# Patient Record
Sex: MALE | Race: WHITE | NOT HISPANIC OR LATINO | URBAN - METROPOLITAN AREA
[De-identification: names, ages, dates, MRNs, and addresses within clinical notes are randomized per-mention and may not be internally consistent; named-entity substitution may affect disease eponyms.]

---

## 2017-01-24 ENCOUNTER — FOLLOW UP ESTABLISHED (OUTPATIENT)
Dept: URBAN - METROPOLITAN AREA CLINIC 24 | Facility: CLINIC | Age: 62
End: 2017-01-24
Payer: COMMERCIAL

## 2017-01-24 PROCEDURE — 92083 EXTENDED VISUAL FIELD XM: CPT | Performed by: OPTOMETRIST

## 2017-01-24 PROCEDURE — 92020 GONIOSCOPY: CPT | Performed by: OPTOMETRIST

## 2017-01-24 PROCEDURE — 76514 ECHO EXAM OF EYE THICKNESS: CPT | Performed by: OPTOMETRIST

## 2017-01-24 PROCEDURE — 92133 CPTRZD OPH DX IMG PST SGM ON: CPT | Performed by: OPTOMETRIST

## 2017-01-24 PROCEDURE — 92012 INTRM OPH EXAM EST PATIENT: CPT | Performed by: OPTOMETRIST

## 2017-01-24 RX ORDER — CYCLOSPORINE 0.5 MG/ML
0.05 % EMULSION OPHTHALMIC
Qty: 60 | Refills: 11 | Status: INACTIVE
Start: 2017-01-24 | End: 2017-02-22

## 2017-01-24 ASSESSMENT — INTRAOCULAR PRESSURE
OS: 21
OD: 19

## 2017-02-02 ENCOUNTER — FOLLOW UP ESTABLISHED (OUTPATIENT)
Dept: URBAN - METROPOLITAN AREA CLINIC 24 | Facility: CLINIC | Age: 62
End: 2017-02-02
Payer: COMMERCIAL

## 2017-02-02 PROCEDURE — 92134 CPTRZ OPH DX IMG PST SGM RTA: CPT | Performed by: OPHTHALMOLOGY

## 2017-02-02 PROCEDURE — 92014 COMPRE OPH EXAM EST PT 1/>: CPT | Performed by: OPHTHALMOLOGY

## 2017-02-02 ASSESSMENT — INTRAOCULAR PRESSURE
OS: 12
OD: 10

## 2017-04-17 ENCOUNTER — Encounter (OUTPATIENT)
Dept: URBAN - METROPOLITAN AREA CLINIC 24 | Facility: CLINIC | Age: 62
End: 2017-04-17
Payer: COMMERCIAL

## 2017-04-17 PROCEDURE — 99213 OFFICE O/P EST LOW 20 MIN: CPT | Performed by: PHYSICIAN ASSISTANT

## 2017-04-20 ENCOUNTER — SURGERY (OUTPATIENT)
Dept: URBAN - METROPOLITAN AREA SURGERY 12 | Facility: SURGERY | Age: 62
End: 2017-04-20
Payer: COMMERCIAL

## 2017-04-20 PROCEDURE — 67042 VIT FOR MACULAR HOLE: CPT | Performed by: OPHTHALMOLOGY

## 2017-04-20 RX ORDER — HYDROCODONE BITARTRATE AND ACETAMINOPHEN 5; 325 MG/1; MG/1
TABLET ORAL
Qty: 10 | Refills: 0 | Status: INACTIVE
Start: 2017-04-20 | End: 2017-05-11

## 2017-04-20 RX ORDER — PREDNISOLONE ACETATE 10 MG/ML
1 % SUSPENSION/ DROPS OPHTHALMIC
Qty: 1 | Refills: 1 | Status: INACTIVE
Start: 2017-04-20 | End: 2017-07-07

## 2017-04-20 RX ORDER — OFLOXACIN 3 MG/ML
0.3 % SOLUTION/ DROPS OPHTHALMIC
Qty: 1 | Refills: 1 | Status: INACTIVE
Start: 2017-04-20 | End: 2017-04-28

## 2017-04-21 ENCOUNTER — POST OP (OUTPATIENT)
Dept: URBAN - METROPOLITAN AREA CLINIC 24 | Facility: CLINIC | Age: 62
End: 2017-04-21

## 2017-04-21 PROCEDURE — 99024 POSTOP FOLLOW-UP VISIT: CPT | Performed by: OPTOMETRIST

## 2017-04-21 ASSESSMENT — INTRAOCULAR PRESSURE: OD: 20

## 2017-04-25 ENCOUNTER — Encounter (OUTPATIENT)
Dept: URBAN - METROPOLITAN AREA CLINIC 30 | Facility: CLINIC | Age: 62
End: 2017-04-25
Payer: COMMERCIAL

## 2017-04-27 ENCOUNTER — SURGERY (OUTPATIENT)
Dept: URBAN - METROPOLITAN AREA SURGERY 12 | Facility: SURGERY | Age: 62
End: 2017-04-27
Payer: COMMERCIAL

## 2017-04-27 PROCEDURE — 67036 REMOVAL OF INNER EYE FLUID: CPT | Performed by: OPHTHALMOLOGY

## 2017-04-27 PROCEDURE — 66825 REPOSITION INTRAOCULAR LENS: CPT | Performed by: OPHTHALMOLOGY

## 2017-04-27 RX ORDER — OFLOXACIN 3 MG/ML
0.3 % SOLUTION/ DROPS OPHTHALMIC
Qty: 1 | Refills: 0 | Status: INACTIVE
Start: 2017-04-27 | End: 2017-07-07

## 2017-04-27 RX ORDER — PREDNISOLONE ACETATE 10 MG/ML
1 % SUSPENSION/ DROPS OPHTHALMIC
Qty: 1 | Refills: 0 | Status: INACTIVE
Start: 2017-04-27 | End: 2017-04-28

## 2017-04-28 ENCOUNTER — POST OP (OUTPATIENT)
Dept: URBAN - METROPOLITAN AREA CLINIC 24 | Facility: CLINIC | Age: 62
End: 2017-04-28

## 2017-04-28 PROCEDURE — 99024 POSTOP FOLLOW-UP VISIT: CPT | Performed by: OPTOMETRIST

## 2017-04-28 ASSESSMENT — INTRAOCULAR PRESSURE: OD: 19

## 2017-05-11 ENCOUNTER — FOLLOW UP ESTABLISHED (OUTPATIENT)
Dept: URBAN - METROPOLITAN AREA CLINIC 24 | Facility: CLINIC | Age: 62
End: 2017-05-11
Payer: COMMERCIAL

## 2017-05-11 PROCEDURE — 99024 POSTOP FOLLOW-UP VISIT: CPT | Performed by: OPHTHALMOLOGY

## 2017-05-11 PROCEDURE — 92134 CPTRZ OPH DX IMG PST SGM RTA: CPT | Performed by: OPHTHALMOLOGY

## 2017-05-11 RX ORDER — NEOMYCIN SULFATE, POLYMYXIN B SULFATE AND DEXAMETHASONE 3.5; 10000; 1 MG/G; [USP'U]/G; MG/G
OINTMENT OPHTHALMIC
Qty: 1 | Refills: 2 | Status: INACTIVE
Start: 2017-05-11 | End: 2017-06-15

## 2017-05-11 ASSESSMENT — INTRAOCULAR PRESSURE
OS: 17
OD: 24

## 2017-06-15 ENCOUNTER — POST OP (OUTPATIENT)
Dept: URBAN - METROPOLITAN AREA CLINIC 24 | Facility: CLINIC | Age: 62
End: 2017-06-15
Payer: COMMERCIAL

## 2017-06-15 PROCEDURE — 92134 CPTRZ OPH DX IMG PST SGM RTA: CPT | Performed by: OPHTHALMOLOGY

## 2017-06-15 PROCEDURE — 99024 POSTOP FOLLOW-UP VISIT: CPT | Performed by: OPHTHALMOLOGY

## 2017-06-15 ASSESSMENT — INTRAOCULAR PRESSURE
OS: 14
OD: 22

## 2017-07-07 ENCOUNTER — RX CHECK (OUTPATIENT)
Dept: URBAN - METROPOLITAN AREA CLINIC 24 | Facility: CLINIC | Age: 62
End: 2017-07-07
Payer: COMMERCIAL

## 2017-07-07 DIAGNOSIS — H40.013 OPEN ANGLE WITH BORDERLINE FINDINGS, LOW RISK, BILATERAL: ICD-10-CM

## 2017-07-07 PROCEDURE — 99024 POSTOP FOLLOW-UP VISIT: CPT | Performed by: OPTOMETRIST

## 2017-07-07 RX ORDER — LIFITEGRAST 50 MG/ML
5 % SOLUTION/ DROPS OPHTHALMIC
Qty: 60 | Refills: 11 | Status: INACTIVE
Start: 2017-07-07 | End: 2018-05-30

## 2017-07-07 ASSESSMENT — VISUAL ACUITY
OD: 20/20
OS: 20/20

## 2017-07-07 ASSESSMENT — INTRAOCULAR PRESSURE
OS: 17
OD: 16

## 2017-07-07 ASSESSMENT — KERATOMETRY
OS: 45.56
OD: 45.34

## 2017-08-17 ENCOUNTER — FOLLOW UP ESTABLISHED (OUTPATIENT)
Dept: URBAN - METROPOLITAN AREA CLINIC 24 | Facility: CLINIC | Age: 62
End: 2017-08-17
Payer: COMMERCIAL

## 2017-08-17 PROCEDURE — 92134 CPTRZ OPH DX IMG PST SGM RTA: CPT | Performed by: OPHTHALMOLOGY

## 2017-08-17 PROCEDURE — 92014 COMPRE OPH EXAM EST PT 1/>: CPT | Performed by: OPHTHALMOLOGY

## 2017-08-17 ASSESSMENT — INTRAOCULAR PRESSURE
OS: 14
OD: 12

## 2017-10-17 ENCOUNTER — FOLLOW UP ESTABLISHED (OUTPATIENT)
Dept: URBAN - METROPOLITAN AREA CLINIC 24 | Facility: CLINIC | Age: 62
End: 2017-10-17
Payer: COMMERCIAL

## 2017-10-17 DIAGNOSIS — H35.371 PUCKERING OF MACULA, RIGHT EYE: ICD-10-CM

## 2017-10-17 DIAGNOSIS — E11.9 TYPE 2 DIABETES MELLITUS WITHOUT COMPLICATIONS: ICD-10-CM

## 2017-10-17 PROCEDURE — 92250 FUNDUS PHOTOGRAPHY W/I&R: CPT | Performed by: OPTOMETRIST

## 2017-10-17 PROCEDURE — 99214 OFFICE O/P EST MOD 30 MIN: CPT | Performed by: OPTOMETRIST

## 2017-10-17 RX ORDER — TIMOLOL MALEATE 5 MG/ML
0.5 % SOLUTION/ DROPS OPHTHALMIC
Qty: 1 | Refills: 5 | Status: INACTIVE
Start: 2017-10-17 | End: 2018-02-15

## 2017-10-17 ASSESSMENT — INTRAOCULAR PRESSURE
OS: 16
OD: 14

## 2017-10-17 ASSESSMENT — VISUAL ACUITY
OD: 20/25
OS: 20/20

## 2017-12-18 ENCOUNTER — FOLLOW UP ESTABLISHED (OUTPATIENT)
Dept: URBAN - METROPOLITAN AREA CLINIC 24 | Facility: CLINIC | Age: 62
End: 2017-12-18
Payer: COMMERCIAL

## 2017-12-18 DIAGNOSIS — H40.023 OPEN ANGLE WITH BORDERLINE FINDINGS, HIGH RISK, BILATERAL: Primary | ICD-10-CM

## 2017-12-18 PROCEDURE — 92250 FUNDUS PHOTOGRAPHY W/I&R: CPT | Performed by: OPTOMETRIST

## 2017-12-18 PROCEDURE — 92083 EXTENDED VISUAL FIELD XM: CPT | Performed by: OPTOMETRIST

## 2017-12-18 PROCEDURE — 92012 INTRM OPH EXAM EST PATIENT: CPT | Performed by: OPTOMETRIST

## 2017-12-18 ASSESSMENT — INTRAOCULAR PRESSURE
OS: 15
OD: 12

## 2018-02-15 ENCOUNTER — FOLLOW UP ESTABLISHED (OUTPATIENT)
Dept: URBAN - METROPOLITAN AREA CLINIC 24 | Facility: CLINIC | Age: 63
End: 2018-02-15
Payer: COMMERCIAL

## 2018-02-15 PROCEDURE — 92134 CPTRZ OPH DX IMG PST SGM RTA: CPT | Performed by: OPHTHALMOLOGY

## 2018-02-15 PROCEDURE — 92014 COMPRE OPH EXAM EST PT 1/>: CPT | Performed by: OPHTHALMOLOGY

## 2018-02-15 RX ORDER — KETOROLAC TROMETHAMINE 5 MG/ML
0.5 % SOLUTION OPHTHALMIC
Qty: 1 | Refills: 1 | Status: INACTIVE
Start: 2018-02-15 | End: 2018-03-29

## 2018-02-15 RX ORDER — TIMOLOL MALEATE 5 MG/ML
0.5 % SOLUTION/ DROPS OPHTHALMIC
Qty: 1 | Refills: 5 | Status: INACTIVE
Start: 2018-02-15 | End: 2018-04-16

## 2018-02-15 RX ORDER — PREDNISOLONE ACETATE 10 MG/ML
1 % SUSPENSION/ DROPS OPHTHALMIC
Qty: 1 | Refills: 0 | Status: INACTIVE
Start: 2018-02-15 | End: 2018-03-29

## 2018-02-15 ASSESSMENT — INTRAOCULAR PRESSURE
OD: 12
OS: 12

## 2018-03-29 ENCOUNTER — FOLLOW UP ESTABLISHED (OUTPATIENT)
Dept: URBAN - METROPOLITAN AREA CLINIC 24 | Facility: CLINIC | Age: 63
End: 2018-03-29
Payer: COMMERCIAL

## 2018-03-29 PROCEDURE — 92134 CPTRZ OPH DX IMG PST SGM RTA: CPT | Performed by: OPHTHALMOLOGY

## 2018-03-29 PROCEDURE — 92014 COMPRE OPH EXAM EST PT 1/>: CPT | Performed by: OPHTHALMOLOGY

## 2018-03-29 RX ORDER — PREDNISOLONE ACETATE 10 MG/ML
1 % SUSPENSION/ DROPS OPHTHALMIC
Qty: 1 | Refills: 2 | Status: INACTIVE
Start: 2018-03-29 | End: 2018-04-24

## 2018-03-29 RX ORDER — KETOROLAC TROMETHAMINE 5 MG/ML
0.5 % SOLUTION OPHTHALMIC
Qty: 1 | Refills: 2 | Status: INACTIVE
Start: 2018-03-29 | End: 2018-11-19

## 2018-03-29 ASSESSMENT — INTRAOCULAR PRESSURE
OS: 15
OD: 14

## 2018-05-30 ENCOUNTER — FOLLOW UP ESTABLISHED (OUTPATIENT)
Dept: URBAN - METROPOLITAN AREA CLINIC 24 | Facility: CLINIC | Age: 63
End: 2018-05-30
Payer: COMMERCIAL

## 2018-05-30 PROCEDURE — 92014 COMPRE OPH EXAM EST PT 1/>: CPT | Performed by: OPHTHALMOLOGY

## 2018-05-30 PROCEDURE — 92134 CPTRZ OPH DX IMG PST SGM RTA: CPT | Performed by: OPHTHALMOLOGY

## 2018-05-30 ASSESSMENT — INTRAOCULAR PRESSURE
OS: 17
OD: 19

## 2018-06-25 ENCOUNTER — FOLLOW UP ESTABLISHED (OUTPATIENT)
Dept: URBAN - METROPOLITAN AREA CLINIC 24 | Facility: CLINIC | Age: 63
End: 2018-06-25
Payer: COMMERCIAL

## 2018-06-25 DIAGNOSIS — H16.143 PUNCTATE KERATITIS, BILATERAL: ICD-10-CM

## 2018-06-25 PROCEDURE — 92012 INTRM OPH EXAM EST PATIENT: CPT | Performed by: OPTOMETRIST

## 2018-06-25 PROCEDURE — 92134 CPTRZ OPH DX IMG PST SGM RTA: CPT | Performed by: OPTOMETRIST

## 2018-06-25 ASSESSMENT — INTRAOCULAR PRESSURE
OS: 13
OD: 19

## 2018-06-26 ENCOUNTER — FOLLOW UP ESTABLISHED (OUTPATIENT)
Dept: URBAN - METROPOLITAN AREA CLINIC 10 | Facility: CLINIC | Age: 63
End: 2018-06-26
Payer: COMMERCIAL

## 2018-06-26 PROCEDURE — 92083 EXTENDED VISUAL FIELD XM: CPT | Performed by: OPHTHALMOLOGY

## 2018-06-26 PROCEDURE — 92133 CPTRZD OPH DX IMG PST SGM ON: CPT | Performed by: OPHTHALMOLOGY

## 2018-06-26 PROCEDURE — 92014 COMPRE OPH EXAM EST PT 1/>: CPT | Performed by: OPHTHALMOLOGY

## 2018-06-26 RX ORDER — DORZOLAMIDE HYDROCHLORIDE AND TIMOLOL MALEATE 20; 5 MG/ML; MG/ML
SOLUTION/ DROPS OPHTHALMIC
Qty: 1 | Refills: 1 | Status: INACTIVE
Start: 2018-06-26 | End: 2018-08-01

## 2018-06-26 ASSESSMENT — INTRAOCULAR PRESSURE
OS: 20
OD: 29

## 2018-08-01 ENCOUNTER — FOLLOW UP ESTABLISHED (OUTPATIENT)
Dept: URBAN - METROPOLITAN AREA CLINIC 10 | Facility: CLINIC | Age: 63
End: 2018-08-01
Payer: COMMERCIAL

## 2018-08-01 PROCEDURE — 92083 EXTENDED VISUAL FIELD XM: CPT | Performed by: OPHTHALMOLOGY

## 2018-08-01 PROCEDURE — 92012 INTRM OPH EXAM EST PATIENT: CPT | Performed by: OPHTHALMOLOGY

## 2018-08-01 RX ORDER — DORZOLAMIDE HYDROCHLORIDE AND TIMOLOL MALEATE 20; 5 MG/ML; MG/ML
SOLUTION/ DROPS OPHTHALMIC
Qty: 3 | Refills: 1 | Status: INACTIVE
Start: 2018-08-01 | End: 2019-06-26

## 2018-08-01 ASSESSMENT — INTRAOCULAR PRESSURE
OD: 16
OS: 17

## 2018-11-19 ENCOUNTER — FOLLOW UP ESTABLISHED (OUTPATIENT)
Dept: URBAN - METROPOLITAN AREA CLINIC 10 | Facility: CLINIC | Age: 63
End: 2018-11-19
Payer: COMMERCIAL

## 2018-11-19 DIAGNOSIS — H40.1133 PRIMARY OPEN-ANGLE GLAUCOMA, BILATERAL, SEVERE STAGE: ICD-10-CM

## 2018-11-19 PROCEDURE — 92012 INTRM OPH EXAM EST PATIENT: CPT | Performed by: OPTOMETRIST

## 2018-11-19 PROCEDURE — 92134 CPTRZ OPH DX IMG PST SGM RTA: CPT | Performed by: OPTOMETRIST

## 2018-11-19 RX ORDER — PREDNISOLONE ACETATE 10 MG/ML
1 % SUSPENSION/ DROPS OPHTHALMIC
Qty: 1 | Refills: 3 | Status: INACTIVE
Start: 2018-11-19 | End: 2019-06-07

## 2018-11-19 RX ORDER — KETOROLAC TROMETHAMINE 5 MG/ML
0.5 % SOLUTION OPHTHALMIC
Qty: 1 | Refills: 2 | Status: INACTIVE
Start: 2018-11-19 | End: 2019-03-22

## 2018-11-19 ASSESSMENT — INTRAOCULAR PRESSURE
OD: 12
OS: 15

## 2018-11-27 ENCOUNTER — FOLLOW UP ESTABLISHED (OUTPATIENT)
Dept: URBAN - METROPOLITAN AREA CLINIC 10 | Facility: CLINIC | Age: 63
End: 2018-11-27
Payer: COMMERCIAL

## 2018-11-27 PROCEDURE — 92134 CPTRZ OPH DX IMG PST SGM RTA: CPT | Performed by: OPTOMETRIST

## 2018-11-27 PROCEDURE — 92012 INTRM OPH EXAM EST PATIENT: CPT | Performed by: OPTOMETRIST

## 2018-11-27 RX ORDER — BRIMONIDINE TARTRATE 2 MG/ML
0.2 % SOLUTION/ DROPS OPHTHALMIC
Qty: 1 | Refills: 3 | Status: INACTIVE
Start: 2018-11-27 | End: 2019-02-20

## 2018-11-27 ASSESSMENT — INTRAOCULAR PRESSURE
OD: 20
OS: 21

## 2018-12-11 ENCOUNTER — NEW PATIENT (OUTPATIENT)
Dept: URBAN - METROPOLITAN AREA CLINIC 24 | Facility: CLINIC | Age: 63
End: 2018-12-11
Payer: MEDICARE

## 2018-12-11 PROCEDURE — 92134 CPTRZ OPH DX IMG PST SGM RTA: CPT | Performed by: OPHTHALMOLOGY

## 2018-12-11 PROCEDURE — 92014 COMPRE OPH EXAM EST PT 1/>: CPT | Performed by: OPHTHALMOLOGY

## 2018-12-11 ASSESSMENT — INTRAOCULAR PRESSURE
OD: 15
OS: 20

## 2019-01-25 ENCOUNTER — FOLLOW UP ESTABLISHED (OUTPATIENT)
Dept: URBAN - METROPOLITAN AREA CLINIC 24 | Facility: CLINIC | Age: 64
End: 2019-01-25
Payer: MEDICARE

## 2019-01-25 PROCEDURE — 92134 CPTRZ OPH DX IMG PST SGM RTA: CPT | Performed by: OPHTHALMOLOGY

## 2019-01-25 PROCEDURE — 92014 COMPRE OPH EXAM EST PT 1/>: CPT | Performed by: OPHTHALMOLOGY

## 2019-01-25 ASSESSMENT — INTRAOCULAR PRESSURE
OS: 20
OD: 17

## 2019-02-15 ENCOUNTER — FOLLOW UP ESTABLISHED (OUTPATIENT)
Dept: URBAN - METROPOLITAN AREA CLINIC 10 | Facility: CLINIC | Age: 64
End: 2019-02-15
Payer: MEDICARE

## 2019-02-15 PROCEDURE — 92012 INTRM OPH EXAM EST PATIENT: CPT | Performed by: OPHTHALMOLOGY

## 2019-02-15 RX ORDER — NETARSUDIL 0.2 MG/ML
0.02 % SOLUTION/ DROPS OPHTHALMIC; TOPICAL
Qty: 1 | Refills: 3 | Status: INACTIVE
Start: 2019-02-15 | End: 2019-06-26

## 2019-02-15 ASSESSMENT — INTRAOCULAR PRESSURE
OD: 30
OS: 40

## 2019-02-27 ENCOUNTER — FOLLOW UP ESTABLISHED (OUTPATIENT)
Dept: URBAN - METROPOLITAN AREA CLINIC 10 | Facility: CLINIC | Age: 64
End: 2019-02-27
Payer: MEDICARE

## 2019-02-27 DIAGNOSIS — H04.123 DRY EYE SYNDROME OF BILATERAL LACRIMAL GLANDS: ICD-10-CM

## 2019-02-27 PROCEDURE — 92083 EXTENDED VISUAL FIELD XM: CPT | Performed by: OPHTHALMOLOGY

## 2019-02-27 PROCEDURE — 92012 INTRM OPH EXAM EST PATIENT: CPT | Performed by: OPHTHALMOLOGY

## 2019-02-27 RX ORDER — FLUOROMETHOLONE 1 MG/ML
0.1 % SUSPENSION/ DROPS OPHTHALMIC
Qty: 1 | Refills: 0 | Status: INACTIVE
Start: 2019-02-27 | End: 2019-06-26

## 2019-02-27 RX ORDER — OFLOXACIN 3 MG/ML
0.3 % SOLUTION/ DROPS OPHTHALMIC
Qty: 1 | Refills: 1 | Status: INACTIVE
Start: 2019-02-27 | End: 2019-09-18

## 2019-02-27 RX ORDER — DUREZOL 0.5 MG/ML
0.05 % EMULSION OPHTHALMIC
Qty: 1 | Refills: 5 | Status: INACTIVE
Start: 2019-02-27 | End: 2019-03-22

## 2019-02-27 ASSESSMENT — INTRAOCULAR PRESSURE
OS: 28
OD: 29

## 2019-03-22 ENCOUNTER — FOLLOW UP ESTABLISHED (OUTPATIENT)
Dept: URBAN - METROPOLITAN AREA CLINIC 24 | Facility: CLINIC | Age: 64
End: 2019-03-22
Payer: MEDICARE

## 2019-03-22 PROCEDURE — 92014 COMPRE OPH EXAM EST PT 1/>: CPT | Performed by: OPHTHALMOLOGY

## 2019-03-22 PROCEDURE — 92134 CPTRZ OPH DX IMG PST SGM RTA: CPT | Performed by: OPHTHALMOLOGY

## 2019-03-22 ASSESSMENT — INTRAOCULAR PRESSURE
OD: 10
OS: 10

## 2019-03-27 ENCOUNTER — FOLLOW UP ESTABLISHED (OUTPATIENT)
Dept: URBAN - METROPOLITAN AREA CLINIC 10 | Facility: CLINIC | Age: 64
End: 2019-03-27
Payer: MEDICARE

## 2019-03-27 PROCEDURE — 92012 INTRM OPH EXAM EST PATIENT: CPT | Performed by: OPHTHALMOLOGY

## 2019-03-27 RX ORDER — OLOPATADINE HYDROCHLORIDE 2 MG/ML
0.2 % SOLUTION/ DROPS OPHTHALMIC
Qty: 1 | Refills: 5 | Status: INACTIVE
Start: 2019-03-27 | End: 2020-01-21

## 2019-03-27 ASSESSMENT — INTRAOCULAR PRESSURE
OS: 24
OD: 20

## 2019-04-30 ENCOUNTER — FOLLOW UP ESTABLISHED (OUTPATIENT)
Dept: URBAN - METROPOLITAN AREA CLINIC 10 | Facility: CLINIC | Age: 64
End: 2019-04-30
Payer: MEDICARE

## 2019-04-30 PROCEDURE — 92012 INTRM OPH EXAM EST PATIENT: CPT | Performed by: OPHTHALMOLOGY

## 2019-04-30 ASSESSMENT — INTRAOCULAR PRESSURE
OS: 27
OD: 20

## 2019-05-01 ENCOUNTER — FOLLOW UP ESTABLISHED (OUTPATIENT)
Dept: URBAN - METROPOLITAN AREA CLINIC 10 | Facility: CLINIC | Age: 64
End: 2019-05-01
Payer: MEDICARE

## 2019-05-01 DIAGNOSIS — H35.353 CYSTOID MACULAR DEGENERATION, BILATERAL: Primary | ICD-10-CM

## 2019-05-01 PROCEDURE — 92014 COMPRE OPH EXAM EST PT 1/>: CPT | Performed by: OPHTHALMOLOGY

## 2019-05-01 PROCEDURE — 92134 CPTRZ OPH DX IMG PST SGM RTA: CPT | Performed by: OPHTHALMOLOGY

## 2019-05-01 RX ORDER — KETOROLAC TROMETHAMINE 5 MG/ML
0.5 % SOLUTION OPHTHALMIC
Qty: 1 | Refills: 6 | Status: INACTIVE
Start: 2019-05-01 | End: 2019-06-26

## 2019-05-01 ASSESSMENT — INTRAOCULAR PRESSURE
OD: 10
OS: 14

## 2019-05-20 ENCOUNTER — FOLLOW UP ESTABLISHED (OUTPATIENT)
Dept: URBAN - METROPOLITAN AREA CLINIC 24 | Facility: CLINIC | Age: 64
End: 2019-05-20
Payer: MEDICARE

## 2019-05-20 PROCEDURE — 92134 CPTRZ OPH DX IMG PST SGM RTA: CPT | Performed by: OPHTHALMOLOGY

## 2019-05-20 PROCEDURE — 92014 COMPRE OPH EXAM EST PT 1/>: CPT | Performed by: OPHTHALMOLOGY

## 2019-05-20 RX ORDER — DICLOFENAC SODIUM 1 MG/ML
0.1 % SOLUTION/ DROPS OPHTHALMIC
Qty: 1 | Refills: 3 | Status: INACTIVE
Start: 2019-05-20 | End: 2019-06-26

## 2019-05-20 ASSESSMENT — INTRAOCULAR PRESSURE
OD: 10
OS: 13

## 2019-06-12 ENCOUNTER — Encounter (OUTPATIENT)
Dept: URBAN - METROPOLITAN AREA CLINIC 24 | Facility: CLINIC | Age: 64
End: 2019-06-12
Payer: MEDICARE

## 2019-06-12 PROCEDURE — 99213 OFFICE O/P EST LOW 20 MIN: CPT | Performed by: PHYSICIAN ASSISTANT

## 2019-06-18 ENCOUNTER — FOLLOW UP ESTABLISHED (OUTPATIENT)
Dept: URBAN - METROPOLITAN AREA CLINIC 24 | Facility: CLINIC | Age: 64
End: 2019-06-18
Payer: MEDICARE

## 2019-06-18 PROCEDURE — 92014 COMPRE OPH EXAM EST PT 1/>: CPT | Performed by: OPHTHALMOLOGY

## 2019-06-18 PROCEDURE — 92134 CPTRZ OPH DX IMG PST SGM RTA: CPT | Performed by: OPHTHALMOLOGY

## 2019-06-18 ASSESSMENT — INTRAOCULAR PRESSURE
OS: 12
OD: 13

## 2019-06-25 ENCOUNTER — SURGERY (OUTPATIENT)
Dept: URBAN - METROPOLITAN AREA SURGERY 5 | Facility: SURGERY | Age: 64
End: 2019-06-25
Payer: MEDICARE

## 2019-06-25 PROCEDURE — 66172 INCISION OF EYE: CPT | Performed by: OPHTHALMOLOGY

## 2019-06-26 ENCOUNTER — FOLLOW UP ESTABLISHED (OUTPATIENT)
Dept: URBAN - METROPOLITAN AREA CLINIC 10 | Facility: CLINIC | Age: 64
End: 2019-06-26

## 2019-06-26 PROCEDURE — 99024 POSTOP FOLLOW-UP VISIT: CPT | Performed by: OPHTHALMOLOGY

## 2019-06-26 RX ORDER — BRIMONIDINE TARTRATE 2 MG/ML
0.2 % SOLUTION/ DROPS OPHTHALMIC
Qty: 1 | Refills: 0 | Status: INACTIVE
Start: 2019-06-26 | End: 2019-08-07

## 2019-06-26 RX ORDER — PREDNISOLONE ACETATE 10 MG/ML
1 % SUSPENSION/ DROPS OPHTHALMIC
Qty: 1 | Refills: 3 | Status: INACTIVE
Start: 2019-06-26 | End: 2019-09-18

## 2019-06-26 RX ORDER — DORZOLAMIDE HYDROCHLORIDE AND TIMOLOL MALEATE 20; 5 MG/ML; MG/ML
SOLUTION/ DROPS OPHTHALMIC
Qty: 3 | Refills: 1 | Status: INACTIVE
Start: 2019-06-26 | End: 2019-09-16

## 2019-06-26 RX ORDER — KETOROLAC TROMETHAMINE 5 MG/ML
0.5 % SOLUTION OPHTHALMIC
Qty: 1 | Refills: 6 | Status: INACTIVE
Start: 2019-06-26 | End: 2019-10-16

## 2019-06-26 RX ORDER — ERYTHROMYCIN 5 MG/G
OINTMENT OPHTHALMIC
Qty: 1 | Refills: 1 | Status: INACTIVE
Start: 2019-06-26 | End: 2019-09-18

## 2019-06-26 ASSESSMENT — INTRAOCULAR PRESSURE
OS: 34
OD: 19

## 2019-07-03 ENCOUNTER — POST OP (OUTPATIENT)
Dept: URBAN - METROPOLITAN AREA CLINIC 10 | Facility: CLINIC | Age: 64
End: 2019-07-03

## 2019-07-03 PROCEDURE — 99024 POSTOP FOLLOW-UP VISIT: CPT | Performed by: OPHTHALMOLOGY

## 2019-07-03 ASSESSMENT — INTRAOCULAR PRESSURE
OD: 47
OS: 36

## 2019-07-09 ENCOUNTER — FOLLOW UP ESTABLISHED (OUTPATIENT)
Dept: URBAN - METROPOLITAN AREA CLINIC 10 | Facility: CLINIC | Age: 64
End: 2019-07-09

## 2019-07-09 DIAGNOSIS — Z98.83 FILTERING (VITREOUS) BLEB AFTER GLAUCOMA SURGERY STATUS: Primary | ICD-10-CM

## 2019-07-09 PROCEDURE — 99024 POSTOP FOLLOW-UP VISIT: CPT | Performed by: OPHTHALMOLOGY

## 2019-07-09 ASSESSMENT — INTRAOCULAR PRESSURE
OS: 33
OD: 14

## 2019-07-17 ENCOUNTER — POST OP (OUTPATIENT)
Dept: URBAN - METROPOLITAN AREA CLINIC 10 | Facility: CLINIC | Age: 64
End: 2019-07-17

## 2019-07-17 PROCEDURE — 99024 POSTOP FOLLOW-UP VISIT: CPT | Performed by: OPHTHALMOLOGY

## 2019-07-17 ASSESSMENT — INTRAOCULAR PRESSURE
OD: 18
OS: 28

## 2019-07-24 ENCOUNTER — FOLLOW UP ESTABLISHED (OUTPATIENT)
Dept: URBAN - METROPOLITAN AREA CLINIC 10 | Facility: CLINIC | Age: 64
End: 2019-07-24

## 2019-07-24 PROCEDURE — 99024 POSTOP FOLLOW-UP VISIT: CPT | Performed by: OPHTHALMOLOGY

## 2019-07-24 ASSESSMENT — INTRAOCULAR PRESSURE
OD: 24
OS: 32

## 2019-07-31 ENCOUNTER — FOLLOW UP ESTABLISHED (OUTPATIENT)
Dept: URBAN - METROPOLITAN AREA CLINIC 10 | Facility: CLINIC | Age: 64
End: 2019-07-31
Payer: MEDICARE

## 2019-07-31 ENCOUNTER — FOLLOW UP ESTABLISHED (OUTPATIENT)
Dept: URBAN - METROPOLITAN AREA CLINIC 10 | Facility: CLINIC | Age: 64
End: 2019-07-31

## 2019-07-31 PROCEDURE — 99024 POSTOP FOLLOW-UP VISIT: CPT | Performed by: OPHTHALMOLOGY

## 2019-07-31 PROCEDURE — 92134 CPTRZ OPH DX IMG PST SGM RTA: CPT | Performed by: OPHTHALMOLOGY

## 2019-07-31 PROCEDURE — 92014 COMPRE OPH EXAM EST PT 1/>: CPT | Performed by: OPHTHALMOLOGY

## 2019-07-31 ASSESSMENT — INTRAOCULAR PRESSURE
OS: 19
OD: 19
OD: 19
OS: 24
OD: 12
OS: 19
OD: 12
OS: 24

## 2019-08-06 ENCOUNTER — FOLLOW UP ESTABLISHED (OUTPATIENT)
Dept: URBAN - METROPOLITAN AREA CLINIC 10 | Facility: CLINIC | Age: 64
End: 2019-08-06

## 2019-08-06 PROCEDURE — 99024 POSTOP FOLLOW-UP VISIT: CPT | Performed by: OPHTHALMOLOGY

## 2019-08-06 RX ORDER — PREDNISOLONE ACETATE 10 MG/ML
1 % SUSPENSION/ DROPS OPHTHALMIC
Qty: 1 | Refills: 1 | Status: INACTIVE
Start: 2019-08-06 | End: 2019-09-18

## 2019-08-06 ASSESSMENT — INTRAOCULAR PRESSURE
OD: 25
OS: 31

## 2019-08-12 ENCOUNTER — Encounter (OUTPATIENT)
Dept: URBAN - METROPOLITAN AREA CLINIC 24 | Facility: CLINIC | Age: 64
End: 2019-08-12
Payer: MEDICARE

## 2019-08-12 PROCEDURE — 99213 OFFICE O/P EST LOW 20 MIN: CPT | Performed by: PHYSICIAN ASSISTANT

## 2019-08-12 RX ORDER — RESVER/WINE/BFL/GRPSD/PC/C/POM 200MG-60MG
CAPSULE ORAL
Qty: 0 | Refills: 0 | Status: INACTIVE
Start: 2019-08-12 | End: 2019-12-03

## 2019-08-20 ENCOUNTER — SURGERY (OUTPATIENT)
Dept: URBAN - METROPOLITAN AREA SURGERY 5 | Facility: SURGERY | Age: 64
End: 2019-08-20
Payer: MEDICARE

## 2019-08-20 PROCEDURE — 66710 CILIARY TRANSSLERAL THERAPY: CPT | Performed by: OPHTHALMOLOGY

## 2019-08-21 ENCOUNTER — POST OP (OUTPATIENT)
Dept: URBAN - METROPOLITAN AREA CLINIC 10 | Facility: CLINIC | Age: 64
End: 2019-08-21

## 2019-08-21 PROCEDURE — 99024 POSTOP FOLLOW-UP VISIT: CPT | Performed by: OPHTHALMOLOGY

## 2019-08-21 ASSESSMENT — INTRAOCULAR PRESSURE
OS: 25
OD: 19

## 2019-08-28 ENCOUNTER — FOLLOW UP ESTABLISHED (OUTPATIENT)
Dept: URBAN - METROPOLITAN AREA CLINIC 10 | Facility: CLINIC | Age: 64
End: 2019-08-28

## 2019-08-28 PROCEDURE — 99024 POSTOP FOLLOW-UP VISIT: CPT | Performed by: OPHTHALMOLOGY

## 2019-08-28 ASSESSMENT — INTRAOCULAR PRESSURE
OD: 5
OS: 29

## 2019-09-18 ENCOUNTER — POST OP (OUTPATIENT)
Dept: URBAN - METROPOLITAN AREA CLINIC 10 | Facility: CLINIC | Age: 64
End: 2019-09-18

## 2019-09-18 PROCEDURE — 99024 POSTOP FOLLOW-UP VISIT: CPT | Performed by: OPHTHALMOLOGY

## 2019-09-18 RX ORDER — BRIMONIDINE TARTRATE 2 MG/ML
0.2 % SOLUTION/ DROPS OPHTHALMIC
Qty: 1 | Refills: 3 | Status: INACTIVE
Start: 2019-09-18 | End: 2019-10-16

## 2019-09-18 RX ORDER — NETARSUDIL 0.2 MG/ML
0.02 % SOLUTION/ DROPS OPHTHALMIC; TOPICAL
Qty: 1 | Refills: 3 | Status: INACTIVE
Start: 2019-09-18 | End: 2019-09-18

## 2019-09-18 RX ORDER — NETARSUDIL 0.2 MG/ML
0.02 % SOLUTION/ DROPS OPHTHALMIC; TOPICAL
Qty: 3 | Refills: 3 | Status: INACTIVE
Start: 2019-09-18 | End: 2019-10-16

## 2019-09-18 RX ORDER — NEPAFENAC 3 MG/ML
0.3 % SUSPENSION OPHTHALMIC
Qty: 3 | Refills: 3 | Status: INACTIVE
Start: 2019-09-18 | End: 2019-10-16

## 2019-09-18 RX ORDER — BRIMONIDINE TARTRATE 2 MG/ML
0.2 % SOLUTION/ DROPS OPHTHALMIC
Qty: 1 | Refills: 3 | Status: INACTIVE
Start: 2019-09-18 | End: 2019-09-18

## 2019-09-18 ASSESSMENT — INTRAOCULAR PRESSURE
OS: 26
OD: 25

## 2019-10-16 ENCOUNTER — FOLLOW UP ESTABLISHED (OUTPATIENT)
Dept: URBAN - METROPOLITAN AREA CLINIC 10 | Facility: CLINIC | Age: 64
End: 2019-10-16

## 2019-10-16 DIAGNOSIS — Z48.89 ENCOUNTER FOR OTHER SPECIFIED SURGICAL AFTERCARE: Primary | ICD-10-CM

## 2019-10-16 PROCEDURE — 99024 POSTOP FOLLOW-UP VISIT: CPT | Performed by: OPHTHALMOLOGY

## 2019-10-16 RX ORDER — DORZOLAMIDE HYDROCHLORIDE AND TIMOLOL MALEATE 20; 5 MG/ML; MG/ML
SOLUTION/ DROPS OPHTHALMIC
Qty: 30 | Refills: 3 | Status: INACTIVE
Start: 2019-10-16 | End: 2019-11-22

## 2019-10-16 RX ORDER — NETARSUDIL 0.2 MG/ML
0.02 % SOLUTION/ DROPS OPHTHALMIC; TOPICAL
Qty: 3 | Refills: 3 | Status: INACTIVE
Start: 2019-10-16 | End: 2019-11-22

## 2019-10-16 RX ORDER — KETOROLAC TROMETHAMINE 5 MG/ML
0.5 % SOLUTION OPHTHALMIC
Qty: 1 | Refills: 6 | Status: INACTIVE
Start: 2019-10-16 | End: 2020-01-21

## 2019-10-16 RX ORDER — BRIMONIDINE TARTRATE 2 MG/ML
0.2 % SOLUTION/ DROPS OPHTHALMIC
Qty: 1 | Refills: 3 | Status: INACTIVE
Start: 2019-10-16 | End: 2020-02-17

## 2019-10-16 RX ORDER — NEPAFENAC 3 MG/ML
0.3 % SUSPENSION OPHTHALMIC
Qty: 3 | Refills: 3 | Status: INACTIVE
Start: 2019-10-16 | End: 2019-11-22

## 2019-10-16 RX ORDER — PREDNISOLONE ACETATE 10 MG/ML
1 % SUSPENSION/ DROPS OPHTHALMIC
Qty: 1 | Refills: 1 | Status: INACTIVE
Start: 2019-10-16 | End: 2020-01-21

## 2019-10-16 ASSESSMENT — INTRAOCULAR PRESSURE
OS: 22
OD: 14

## 2019-11-05 ENCOUNTER — FOLLOW UP ESTABLISHED (OUTPATIENT)
Dept: URBAN - METROPOLITAN AREA CLINIC 24 | Facility: CLINIC | Age: 64
End: 2019-11-05
Payer: MEDICARE

## 2019-11-05 PROCEDURE — 92014 COMPRE OPH EXAM EST PT 1/>: CPT | Performed by: OPHTHALMOLOGY

## 2019-11-05 PROCEDURE — 92134 CPTRZ OPH DX IMG PST SGM RTA: CPT | Performed by: OPHTHALMOLOGY

## 2019-11-05 ASSESSMENT — INTRAOCULAR PRESSURE
OS: 9
OD: 7

## 2019-11-22 ENCOUNTER — FOLLOW UP ESTABLISHED (OUTPATIENT)
Dept: URBAN - METROPOLITAN AREA CLINIC 10 | Facility: CLINIC | Age: 64
End: 2019-11-22
Payer: MEDICARE

## 2019-11-22 PROCEDURE — 92014 COMPRE OPH EXAM EST PT 1/>: CPT | Performed by: OPHTHALMOLOGY

## 2019-11-22 RX ORDER — NEPAFENAC 3 MG/ML
0.3 % SUSPENSION OPHTHALMIC
Qty: 1 | Refills: 3 | Status: INACTIVE
Start: 2019-11-22 | End: 2020-01-21

## 2019-11-22 RX ORDER — DORZOLAMIDE HYDROCHLORIDE AND TIMOLOL MALEATE 20; 5 MG/ML; MG/ML
SOLUTION/ DROPS OPHTHALMIC
Qty: 360 | Refills: 1 | Status: INACTIVE
Start: 2019-11-22 | End: 2020-01-21

## 2019-11-22 RX ORDER — NETARSUDIL 0.2 MG/ML
0.02 % SOLUTION/ DROPS OPHTHALMIC; TOPICAL
Qty: 3 | Refills: 3 | Status: INACTIVE
Start: 2019-11-22 | End: 2020-12-04

## 2019-11-22 ASSESSMENT — INTRAOCULAR PRESSURE
OD: 9
OS: 12

## 2019-12-03 ENCOUNTER — FOLLOW UP ESTABLISHED (OUTPATIENT)
Dept: URBAN - METROPOLITAN AREA CLINIC 24 | Facility: CLINIC | Age: 64
End: 2019-12-03
Payer: MEDICARE

## 2019-12-03 PROCEDURE — 92134 CPTRZ OPH DX IMG PST SGM RTA: CPT | Performed by: OPHTHALMOLOGY

## 2019-12-03 PROCEDURE — 92014 COMPRE OPH EXAM EST PT 1/>: CPT | Performed by: OPHTHALMOLOGY

## 2019-12-03 RX ORDER — NEPAFENAC 3 MG/ML
0.3 % SUSPENSION OPHTHALMIC
Qty: 3 | Refills: 3 | Status: INACTIVE
Start: 2019-12-03 | End: 2020-04-07

## 2019-12-03 RX ORDER — NEPAFENAC 3 MG/ML
0.3 % SUSPENSION OPHTHALMIC
Qty: 3 | Refills: 3 | Status: INACTIVE
Start: 2019-12-03 | End: 2019-12-03

## 2019-12-03 ASSESSMENT — INTRAOCULAR PRESSURE
OD: 8
OS: 12

## 2020-01-14 ENCOUNTER — FOLLOW UP ESTABLISHED (OUTPATIENT)
Dept: URBAN - METROPOLITAN AREA CLINIC 24 | Facility: CLINIC | Age: 65
End: 2020-01-14
Payer: MEDICARE

## 2020-01-14 PROCEDURE — 92134 CPTRZ OPH DX IMG PST SGM RTA: CPT | Performed by: OPHTHALMOLOGY

## 2020-01-14 PROCEDURE — 92014 COMPRE OPH EXAM EST PT 1/>: CPT | Performed by: OPHTHALMOLOGY

## 2020-01-14 ASSESSMENT — INTRAOCULAR PRESSURE
OD: 18
OS: 34

## 2020-01-21 ENCOUNTER — FOLLOW UP ESTABLISHED (OUTPATIENT)
Dept: URBAN - METROPOLITAN AREA CLINIC 24 | Facility: CLINIC | Age: 65
End: 2020-01-21
Payer: MEDICARE

## 2020-01-21 PROCEDURE — 92133 CPTRZD OPH DX IMG PST SGM ON: CPT | Performed by: OPHTHALMOLOGY

## 2020-01-21 PROCEDURE — 92083 EXTENDED VISUAL FIELD XM: CPT | Performed by: OPHTHALMOLOGY

## 2020-01-21 PROCEDURE — 92014 COMPRE OPH EXAM EST PT 1/>: CPT | Performed by: OPHTHALMOLOGY

## 2020-01-21 PROCEDURE — 92250 FUNDUS PHOTOGRAPHY W/I&R: CPT | Performed by: OPHTHALMOLOGY

## 2020-01-21 RX ORDER — DORZOLAMIDE HYDROCHLORIDE AND TIMOLOL MALEATE 20; 5 MG/ML; MG/ML
SOLUTION/ DROPS OPHTHALMIC
Qty: 360 | Refills: 1 | Status: INACTIVE
Start: 2020-01-21 | End: 2021-01-18

## 2020-01-21 ASSESSMENT — INTRAOCULAR PRESSURE
OD: 34
OS: 44

## 2020-01-22 ENCOUNTER — OFFICE VISIT (OUTPATIENT)
Dept: URBAN - METROPOLITAN AREA CLINIC 23 | Facility: CLINIC | Age: 65
End: 2020-01-22
Payer: MEDICARE

## 2020-01-22 PROCEDURE — 92020 GONIOSCOPY: CPT | Performed by: OPHTHALMOLOGY

## 2020-01-22 PROCEDURE — 99214 OFFICE O/P EST MOD 30 MIN: CPT | Performed by: OPHTHALMOLOGY

## 2020-01-22 ASSESSMENT — INTRAOCULAR PRESSURE
OS: 29
OD: 19

## 2020-01-22 NOTE — IMPRESSION/PLAN
Impression: Bilateral primary open-angle glaucoma, indeterminate stage: H40.1134. Trabeculectomy , MMC/poss tutoplast, MicroPulse OD, PPVIT/ reposition. suture IOL OD, PPVIT, MP +IVTA+KENALOG OD, VIT with IOL replacement OD, YAG OSx 2 , PPVIT R+R with suture IOL OD, Phaco PC IOL OU, MF IOL removal and replacement with standard lens OS Plan: Pt has Glaucoma Gonio: OD) cbb 1-2+ pg OS) cbb 2-3+ pg        Pachs: 510/532     Today's IOP :  19, 29        // Tmax & date :  52, 44 Target IOP low to mid teens Pt denies Fhx of Glaucoma Left eye is the better seeing eye HVF (1/21/2020) Nasal [place] OU
C/D: .95-.95 OU 
OCT: (1/21/2020) 55, 61 Pt denies Sulfa Allergy   // Pt denies Lung /Heart dx Pt is currently using : Continue PF Cosopt OU BID, Rhopressa OU QPM, Brimonidine OU  BID TO TID, Ilevro OU QDAY No previous medications used Plan :
1. Continue PF Cosopt OU BID 2. Continue Rhopressa OU QPM
3. Continue  Brimonidine OU  BID TO TID 4. Continue Ilevro OU QDAY 5. Patient presents today as surgical consult for high IOP. Given patients surgical history and today's IOP, suggested SLT. 6.  Recommend SLT OD THEN OS. The patient is aware of the limitations of SLT , which can lower IOP 2-4mm for 6--12 months. The Patient is aware that SLT cannot improve the vision nor eliminate the need for the topical medications. If on any glaucoma medications, the patient is aware that SLT is not a replacement for the current medical regimen. **Risk level 1
7. Explained to patient that non compliance with drops DOES affect IOP and Prednisolone takes more than a week to exit the system. 8. No Steroid drop post operative to SLT due to high IOP's 
9. Also discussed Micropulse OD or Tube Shunt OS - pt defers 1/22/2020 (hesitant to have more surgery at this time) 10. Pt IOP improved substantially today but still too high 11. Pt to return in 1 week or PRN with worsening of VA, pain, redness or decline in [address] [facility]
*** 6-8 week Follow up after laser Discussed details about Glaucoma and that without proper control of pressures irreversible blindness can occur. Patient understands risks. Emphasize compliance with drop and without compliance vision loss progression can occur.

## 2020-01-28 ENCOUNTER — OFFICE VISIT (OUTPATIENT)
Dept: URBAN - METROPOLITAN AREA CLINIC 23 | Facility: CLINIC | Age: 65
End: 2020-01-28
Payer: MEDICARE

## 2020-01-28 PROCEDURE — 99214 OFFICE O/P EST MOD 30 MIN: CPT | Performed by: OPHTHALMOLOGY

## 2020-01-28 ASSESSMENT — INTRAOCULAR PRESSURE
OD: 15
OS: 30

## 2020-01-28 NOTE — IMPRESSION/PLAN
Impression: Bilateral primary open-angle glaucoma, indeterminate stage: H40.1134. Trabeculectomy , MMC/poss tutoplast, MicroPulse OD, PPVIT/ reposition. suture IOL OD, PPVIT, MP +IVTA+KENALOG OD, VIT with IOL replacement OD, YAG OSx 2 , PPVIT R+R with suture IOL OD, Phaco PC IOL OU, MF IOL removal and replacement with standard lens OS Plan: Pt has Glaucoma Gonio: OD) cbb 1-2+ pg OS) cbb 2-3+ pg        Pachs: 510/532     Today's IOP :  15, 30       // Tmax & date :  52, 44 Target IOP low to mid teens Pt denies Fhx of Glaucoma Left eye is the better seeing eye HVF (1/21/2020) Nasal [place] OU
C/D: .95-.95 OU 
OCT: (1/21/2020) 55, 61 Pt denies Sulfa Allergy   // Pt denies Lung /Heart dx Pt is currently using : Continue PF Cosopt OU BID, Rhopressa OU QPM, Brimonidine OU  BID TO TID, Ilevro OU QDAY No previous medications used Plan :
1. Continue PF Cosopt OU BID 2. Continue Rhopressa OU QPM
3. Continue  Brimonidine OU  BID TO TID 4. Continue Ilevro OU QDAY 5. Patient presents today as surgical consult for high IOP. Given patients surgical history and today's IOP, suggested SLT. 6.  Recommend SLT OD THEN OS. The patient is aware of the limitations of SLT , which can lower IOP 2-4mm for 6--12 months. The Patient is aware that SLT cannot improve the vision nor eliminate the need for the topical medications. If on any glaucoma medications, the patient is aware that SLT is not a replacement for the current medical regimen. **Risk level 1
7. Explained to patient that non compliance with drops DOES affect IOP and Prednisolone takes more than a week to exit the system. 8. No Steroid drop post operative to SLT 2/2 steroid responder. 9. Rediscussed Micropulse OD or Tube Shunt OS - pt defers 1/22/2020- (1/28/2020) (hesitant to have more surgery at this time) 10. Pt IOP improved substantially today OD but not improved OS 11. Schedule SLT OD then OS -- check IOP OD at time of SLT. if improved able to postpone laser OD. 
*** 6-8 week Follow up after laser Discussed details about Glaucoma and that without proper control of pressures irreversible blindness can occur. Patient understands risks. Emphasize compliance with drop and without compliance vision loss progression can occur.

## 2020-01-31 ENCOUNTER — SURGERY (OUTPATIENT)
Dept: URBAN - METROPOLITAN AREA SURGERY 12 | Facility: SURGERY | Age: 65
End: 2020-01-31
Payer: MEDICARE

## 2020-01-31 PROCEDURE — 65855 TRABECULOPLASTY LASER SURG: CPT | Performed by: OPHTHALMOLOGY

## 2020-02-25 ENCOUNTER — FOLLOW UP ESTABLISHED (OUTPATIENT)
Dept: URBAN - METROPOLITAN AREA CLINIC 24 | Facility: CLINIC | Age: 65
End: 2020-02-25
Payer: MEDICARE

## 2020-02-25 PROCEDURE — 92014 COMPRE OPH EXAM EST PT 1/>: CPT | Performed by: OPHTHALMOLOGY

## 2020-02-25 PROCEDURE — 92134 CPTRZ OPH DX IMG PST SGM RTA: CPT | Performed by: OPHTHALMOLOGY

## 2020-02-25 ASSESSMENT — INTRAOCULAR PRESSURE
OD: 9
OS: 13

## 2020-03-13 ENCOUNTER — FOLLOW UP ESTABLISHED (OUTPATIENT)
Dept: URBAN - METROPOLITAN AREA CLINIC 24 | Facility: CLINIC | Age: 65
End: 2020-03-13
Payer: MEDICARE

## 2020-03-13 PROCEDURE — 92134 CPTRZ OPH DX IMG PST SGM RTA: CPT | Performed by: OPHTHALMOLOGY

## 2020-03-13 PROCEDURE — 92012 INTRM OPH EXAM EST PATIENT: CPT | Performed by: OPHTHALMOLOGY

## 2020-03-13 ASSESSMENT — INTRAOCULAR PRESSURE
OD: 14
OS: 19

## 2020-04-07 ENCOUNTER — FOLLOW UP ESTABLISHED (OUTPATIENT)
Dept: URBAN - METROPOLITAN AREA CLINIC 24 | Facility: CLINIC | Age: 65
End: 2020-04-07
Payer: MEDICARE

## 2020-04-07 PROCEDURE — 92134 CPTRZ OPH DX IMG PST SGM RTA: CPT | Performed by: OPHTHALMOLOGY

## 2020-04-07 PROCEDURE — 92014 COMPRE OPH EXAM EST PT 1/>: CPT | Performed by: OPHTHALMOLOGY

## 2020-04-07 RX ORDER — NEPAFENAC 3 MG/ML
0.3 % SUSPENSION OPHTHALMIC
Qty: 3 | Refills: 3 | Status: INACTIVE
Start: 2020-04-07 | End: 2020-07-14

## 2020-04-07 ASSESSMENT — INTRAOCULAR PRESSURE
OS: 17
OD: 15

## 2020-05-05 ENCOUNTER — FOLLOW UP ESTABLISHED (OUTPATIENT)
Dept: URBAN - METROPOLITAN AREA CLINIC 24 | Facility: CLINIC | Age: 65
End: 2020-05-05
Payer: MEDICARE

## 2020-05-05 PROCEDURE — 92134 CPTRZ OPH DX IMG PST SGM RTA: CPT | Performed by: OPHTHALMOLOGY

## 2020-05-05 PROCEDURE — 92014 COMPRE OPH EXAM EST PT 1/>: CPT | Performed by: OPHTHALMOLOGY

## 2020-05-05 RX ORDER — LOTEPREDNOL ETABONATE 5 MG/ML
0.5 % SUSPENSION/ DROPS OPHTHALMIC
Qty: 10 | Refills: 3 | Status: INACTIVE
Start: 2020-05-05 | End: 2020-05-05

## 2020-05-05 ASSESSMENT — INTRAOCULAR PRESSURE
OS: 14
OD: 11

## 2020-06-02 ENCOUNTER — FOLLOW UP ESTABLISHED (OUTPATIENT)
Dept: URBAN - METROPOLITAN AREA CLINIC 24 | Facility: CLINIC | Age: 65
End: 2020-06-02
Payer: MEDICARE

## 2020-06-02 DIAGNOSIS — H16.223 KERATOCONJUNCTIVITIS SICCA, BILATERAL: ICD-10-CM

## 2020-06-02 PROCEDURE — 92134 CPTRZ OPH DX IMG PST SGM RTA: CPT | Performed by: OPHTHALMOLOGY

## 2020-06-02 PROCEDURE — 92014 COMPRE OPH EXAM EST PT 1/>: CPT | Performed by: OPHTHALMOLOGY

## 2020-06-02 ASSESSMENT — INTRAOCULAR PRESSURE
OS: 13
OD: 12

## 2020-06-17 ENCOUNTER — FOLLOW UP ESTABLISHED (OUTPATIENT)
Dept: URBAN - METROPOLITAN AREA CLINIC 24 | Facility: CLINIC | Age: 65
End: 2020-06-17
Payer: MEDICARE

## 2020-06-17 PROCEDURE — 92014 COMPRE OPH EXAM EST PT 1/>: CPT | Performed by: OPHTHALMOLOGY

## 2020-06-17 ASSESSMENT — INTRAOCULAR PRESSURE
OD: 9
OS: 11

## 2020-06-19 ENCOUNTER — SURGERY (OUTPATIENT)
Dept: URBAN - METROPOLITAN AREA SURGERY 12 | Facility: SURGERY | Age: 65
End: 2020-06-19
Payer: MEDICARE

## 2020-07-14 ENCOUNTER — FOLLOW UP ESTABLISHED (OUTPATIENT)
Dept: URBAN - METROPOLITAN AREA CLINIC 24 | Facility: CLINIC | Age: 65
End: 2020-07-14
Payer: MEDICARE

## 2020-07-14 PROCEDURE — 92014 COMPRE OPH EXAM EST PT 1/>: CPT | Performed by: OPHTHALMOLOGY

## 2020-07-14 PROCEDURE — 92134 CPTRZ OPH DX IMG PST SGM RTA: CPT | Performed by: OPHTHALMOLOGY

## 2020-07-14 RX ORDER — NEPAFENAC 3 MG/ML
0.3 % SUSPENSION OPHTHALMIC
Qty: 3 | Refills: 3 | Status: INACTIVE
Start: 2020-07-14 | End: 2020-08-11

## 2020-07-14 ASSESSMENT — INTRAOCULAR PRESSURE
OS: 13
OD: 21

## 2020-07-30 ENCOUNTER — FOLLOW UP ESTABLISHED (OUTPATIENT)
Dept: URBAN - METROPOLITAN AREA CLINIC 24 | Facility: CLINIC | Age: 65
End: 2020-07-30
Payer: MEDICARE

## 2020-07-30 PROCEDURE — 92012 INTRM OPH EXAM EST PATIENT: CPT | Performed by: OPHTHALMOLOGY

## 2020-07-30 ASSESSMENT — INTRAOCULAR PRESSURE
OD: 25
OS: 14

## 2020-08-11 ENCOUNTER — RX CHECK (OUTPATIENT)
Dept: URBAN - METROPOLITAN AREA CLINIC 24 | Facility: CLINIC | Age: 65
End: 2020-08-11
Payer: MEDICARE

## 2020-08-11 PROCEDURE — 92134 CPTRZ OPH DX IMG PST SGM RTA: CPT | Performed by: OPHTHALMOLOGY

## 2020-08-11 PROCEDURE — 92014 COMPRE OPH EXAM EST PT 1/>: CPT | Performed by: OPHTHALMOLOGY

## 2020-08-11 RX ORDER — LOTEPREDNOL ETABONATE 5 MG/ML
0.5 % SUSPENSION/ DROPS OPHTHALMIC
Qty: 10 | Refills: 3 | Status: INACTIVE
Start: 2020-08-11 | End: 2020-08-12

## 2020-08-11 RX ORDER — NEPAFENAC 3 MG/ML
0.3 % SUSPENSION OPHTHALMIC
Qty: 3 | Refills: 3 | Status: INACTIVE
Start: 2020-08-11 | End: 2020-12-04

## 2020-08-11 ASSESSMENT — INTRAOCULAR PRESSURE
OS: 12
OD: 12

## 2020-08-20 ENCOUNTER — FOLLOW UP ESTABLISHED (OUTPATIENT)
Dept: URBAN - METROPOLITAN AREA CLINIC 24 | Facility: CLINIC | Age: 65
End: 2020-08-20
Payer: MEDICARE

## 2020-08-20 PROCEDURE — 92012 INTRM OPH EXAM EST PATIENT: CPT | Performed by: OPHTHALMOLOGY

## 2020-08-20 ASSESSMENT — INTRAOCULAR PRESSURE
OS: 14
OD: 14

## 2020-09-08 ENCOUNTER — FOLLOW UP ESTABLISHED (OUTPATIENT)
Dept: URBAN - METROPOLITAN AREA CLINIC 24 | Facility: CLINIC | Age: 65
End: 2020-09-08
Payer: MEDICARE

## 2020-09-08 PROCEDURE — 92014 COMPRE OPH EXAM EST PT 1/>: CPT | Performed by: OPHTHALMOLOGY

## 2020-09-08 PROCEDURE — 92134 CPTRZ OPH DX IMG PST SGM RTA: CPT | Performed by: OPHTHALMOLOGY

## 2020-09-08 ASSESSMENT — INTRAOCULAR PRESSURE
OD: 5
OS: 5

## 2020-10-06 ENCOUNTER — FOLLOW UP ESTABLISHED (OUTPATIENT)
Dept: URBAN - METROPOLITAN AREA CLINIC 24 | Facility: CLINIC | Age: 65
End: 2020-10-06
Payer: MEDICARE

## 2020-10-06 PROCEDURE — 92014 COMPRE OPH EXAM EST PT 1/>: CPT | Performed by: OPHTHALMOLOGY

## 2020-10-06 PROCEDURE — 92134 CPTRZ OPH DX IMG PST SGM RTA: CPT | Performed by: OPHTHALMOLOGY

## 2020-10-06 ASSESSMENT — INTRAOCULAR PRESSURE
OD: 14
OS: 14

## 2020-10-07 ENCOUNTER — FOLLOW UP ESTABLISHED (OUTPATIENT)
Dept: URBAN - METROPOLITAN AREA CLINIC 24 | Facility: CLINIC | Age: 65
End: 2020-10-07
Payer: MEDICARE

## 2020-10-07 DIAGNOSIS — H40.1134 PRIMARY OPEN-ANGLE GLAUCOMA, BILATERAL, INDETERMINATE STAGE: Primary | ICD-10-CM

## 2020-10-07 PROCEDURE — 92012 INTRM OPH EXAM EST PATIENT: CPT | Performed by: OPHTHALMOLOGY

## 2020-10-07 ASSESSMENT — INTRAOCULAR PRESSURE
OS: 15
OD: 10

## 2020-11-17 ENCOUNTER — FOLLOW UP ESTABLISHED (OUTPATIENT)
Dept: URBAN - METROPOLITAN AREA CLINIC 24 | Facility: CLINIC | Age: 65
End: 2020-11-17
Payer: MEDICARE

## 2020-11-17 PROCEDURE — 92134 CPTRZ OPH DX IMG PST SGM RTA: CPT | Performed by: OPHTHALMOLOGY

## 2020-11-17 PROCEDURE — 92014 COMPRE OPH EXAM EST PT 1/>: CPT | Performed by: OPHTHALMOLOGY

## 2020-11-17 ASSESSMENT — INTRAOCULAR PRESSURE
OD: 13
OS: 19

## 2020-12-04 ENCOUNTER — FOLLOW UP ESTABLISHED (OUTPATIENT)
Dept: URBAN - METROPOLITAN AREA CLINIC 24 | Facility: CLINIC | Age: 65
End: 2020-12-04
Payer: MEDICARE

## 2020-12-04 PROCEDURE — 92014 COMPRE OPH EXAM EST PT 1/>: CPT | Performed by: OPHTHALMOLOGY

## 2020-12-04 RX ORDER — NEPAFENAC 3 MG/ML
0.3 % SUSPENSION OPHTHALMIC
Qty: 3 | Refills: 3 | Status: INACTIVE
Start: 2020-12-04 | End: 2021-04-19

## 2020-12-04 RX ORDER — NETARSUDIL 0.2 MG/ML
0.02 % SOLUTION/ DROPS OPHTHALMIC; TOPICAL
Qty: 10 | Refills: 3 | Status: INACTIVE
Start: 2020-12-04 | End: 2021-04-19

## 2020-12-04 RX ORDER — OFLOXACIN 3 MG/ML
0.3 % SOLUTION/ DROPS OPHTHALMIC
Qty: 10 | Refills: 1 | Status: INACTIVE
Start: 2020-12-04 | End: 2021-01-26

## 2020-12-04 ASSESSMENT — INTRAOCULAR PRESSURE
OS: 19
OD: 13

## 2020-12-15 ENCOUNTER — FOLLOW UP ESTABLISHED (OUTPATIENT)
Dept: URBAN - METROPOLITAN AREA CLINIC 24 | Facility: CLINIC | Age: 65
End: 2020-12-15
Payer: MEDICARE

## 2020-12-15 PROCEDURE — 92014 COMPRE OPH EXAM EST PT 1/>: CPT | Performed by: OPHTHALMOLOGY

## 2020-12-15 PROCEDURE — 92134 CPTRZ OPH DX IMG PST SGM RTA: CPT | Performed by: OPHTHALMOLOGY

## 2020-12-17 ENCOUNTER — FOLLOW UP ESTABLISHED (OUTPATIENT)
Dept: URBAN - METROPOLITAN AREA CLINIC 24 | Facility: CLINIC | Age: 65
End: 2020-12-17
Payer: MEDICARE

## 2020-12-17 PROCEDURE — 99213 OFFICE O/P EST LOW 20 MIN: CPT | Performed by: PHYSICIAN ASSISTANT

## 2020-12-17 RX ORDER — PREDNISOLONE ACETATE 10 MG/ML
1 % SUSPENSION/ DROPS OPHTHALMIC
Qty: 15 | Refills: 1 | Status: INACTIVE
Start: 2020-12-17 | End: 2022-02-21

## 2020-12-30 ENCOUNTER — FOLLOW UP ESTABLISHED (OUTPATIENT)
Dept: URBAN - METROPOLITAN AREA CLINIC 24 | Facility: CLINIC | Age: 65
End: 2020-12-30
Payer: MEDICARE

## 2020-12-30 PROCEDURE — 92012 INTRM OPH EXAM EST PATIENT: CPT | Performed by: OPHTHALMOLOGY

## 2020-12-30 ASSESSMENT — INTRAOCULAR PRESSURE
OD: 15
OS: 19

## 2020-12-31 ENCOUNTER — SURGERY (OUTPATIENT)
Dept: URBAN - METROPOLITAN AREA SURGERY 12 | Facility: SURGERY | Age: 65
End: 2020-12-31
Payer: MEDICARE

## 2020-12-31 PROCEDURE — 66174 TRLUML DIL AQ O/F CAN W/O ST: CPT | Performed by: OPHTHALMOLOGY

## 2021-01-01 ENCOUNTER — POST OP (OUTPATIENT)
Dept: URBAN - METROPOLITAN AREA CLINIC 10 | Facility: CLINIC | Age: 66
End: 2021-01-01
Payer: MEDICARE

## 2021-01-01 DIAGNOSIS — Z96.1 PRESENCE OF INTRAOCULAR LENS: Primary | ICD-10-CM

## 2021-01-01 PROCEDURE — 99024 POSTOP FOLLOW-UP VISIT: CPT | Performed by: OPTOMETRIST

## 2021-01-01 ASSESSMENT — INTRAOCULAR PRESSURE: OS: 22

## 2021-01-04 ENCOUNTER — FOLLOW UP ESTABLISHED (OUTPATIENT)
Dept: URBAN - METROPOLITAN AREA CLINIC 24 | Facility: CLINIC | Age: 66
End: 2021-01-04
Payer: MEDICARE

## 2021-01-04 PROCEDURE — 67028 INJECTION EYE DRUG: CPT | Performed by: OPHTHALMOLOGY

## 2021-01-04 PROCEDURE — 65800 DRAINAGE OF EYE: CPT | Performed by: OPHTHALMOLOGY

## 2021-01-04 PROCEDURE — 92134 CPTRZ OPH DX IMG PST SGM RTA: CPT | Performed by: OPHTHALMOLOGY

## 2021-01-04 PROCEDURE — 99214 OFFICE O/P EST MOD 30 MIN: CPT | Performed by: OPTOMETRIST

## 2021-01-04 ASSESSMENT — INTRAOCULAR PRESSURE
OD: 19
OD: 19
OS: 19
OS: 19

## 2021-01-07 ENCOUNTER — POST OP (OUTPATIENT)
Dept: URBAN - METROPOLITAN AREA CLINIC 24 | Facility: CLINIC | Age: 66
End: 2021-01-07
Payer: MEDICARE

## 2021-01-07 DIAGNOSIS — H00.019 HORDEOLUM EXTERNUM UNSPECIFIED EYE, UNSPECIFIED EYELID: ICD-10-CM

## 2021-01-07 PROCEDURE — 99024 POSTOP FOLLOW-UP VISIT: CPT | Performed by: OPHTHALMOLOGY

## 2021-01-07 ASSESSMENT — INTRAOCULAR PRESSURE
OS: 22
OD: 14

## 2021-01-14 ENCOUNTER — POST OP (OUTPATIENT)
Dept: URBAN - METROPOLITAN AREA CLINIC 24 | Facility: CLINIC | Age: 66
End: 2021-01-14
Payer: MEDICARE

## 2021-01-14 DIAGNOSIS — H20.9 UNSPECIFIED IRIDOCYCLITIS: ICD-10-CM

## 2021-01-14 PROCEDURE — 99024 POSTOP FOLLOW-UP VISIT: CPT | Performed by: OPHTHALMOLOGY

## 2021-01-14 ASSESSMENT — INTRAOCULAR PRESSURE
OS: 20
OD: 13

## 2021-01-26 ENCOUNTER — FOLLOW UP ESTABLISHED (OUTPATIENT)
Dept: URBAN - METROPOLITAN AREA CLINIC 24 | Facility: CLINIC | Age: 66
End: 2021-01-26
Payer: MEDICARE

## 2021-01-26 PROCEDURE — 92134 CPTRZ OPH DX IMG PST SGM RTA: CPT | Performed by: OPHTHALMOLOGY

## 2021-01-26 PROCEDURE — 99214 OFFICE O/P EST MOD 30 MIN: CPT | Performed by: OPHTHALMOLOGY

## 2021-01-26 ASSESSMENT — INTRAOCULAR PRESSURE
OD: 11
OS: 8

## 2021-01-28 ENCOUNTER — FOLLOW UP ESTABLISHED (OUTPATIENT)
Dept: URBAN - METROPOLITAN AREA CLINIC 24 | Facility: CLINIC | Age: 66
End: 2021-01-28
Payer: MEDICARE

## 2021-01-28 DIAGNOSIS — H40.1112 PRIMARY OPEN-ANGLE GLAUCOMA, RIGHT EYE, MODERATE STAGE: ICD-10-CM

## 2021-01-28 PROCEDURE — 99213 OFFICE O/P EST LOW 20 MIN: CPT | Performed by: OPHTHALMOLOGY

## 2021-01-28 PROCEDURE — 99024 POSTOP FOLLOW-UP VISIT: CPT | Performed by: OPHTHALMOLOGY

## 2021-01-28 ASSESSMENT — INTRAOCULAR PRESSURE
OD: 11
OS: 9

## 2021-02-23 ENCOUNTER — FOLLOW UP ESTABLISHED (OUTPATIENT)
Dept: URBAN - METROPOLITAN AREA CLINIC 24 | Facility: CLINIC | Age: 66
End: 2021-02-23
Payer: MEDICARE

## 2021-02-23 PROCEDURE — 99214 OFFICE O/P EST MOD 30 MIN: CPT | Performed by: OPHTHALMOLOGY

## 2021-02-23 PROCEDURE — 92134 CPTRZ OPH DX IMG PST SGM RTA: CPT | Performed by: OPHTHALMOLOGY

## 2021-02-23 ASSESSMENT — INTRAOCULAR PRESSURE
OS: 12
OD: 22

## 2021-02-24 ENCOUNTER — POST OP (OUTPATIENT)
Dept: URBAN - METROPOLITAN AREA CLINIC 24 | Facility: CLINIC | Age: 66
End: 2021-02-24
Payer: MEDICARE

## 2021-02-24 PROCEDURE — 99024 POSTOP FOLLOW-UP VISIT: CPT | Performed by: OPHTHALMOLOGY

## 2021-02-24 ASSESSMENT — INTRAOCULAR PRESSURE
OD: 41
OS: 17

## 2021-03-03 ENCOUNTER — FOLLOW UP ESTABLISHED (OUTPATIENT)
Dept: URBAN - METROPOLITAN AREA CLINIC 30 | Facility: CLINIC | Age: 66
End: 2021-03-03
Payer: MEDICARE

## 2021-03-03 PROCEDURE — 99024 POSTOP FOLLOW-UP VISIT: CPT | Performed by: OPHTHALMOLOGY

## 2021-03-03 PROCEDURE — 99214 OFFICE O/P EST MOD 30 MIN: CPT | Performed by: OPHTHALMOLOGY

## 2021-03-03 ASSESSMENT — INTRAOCULAR PRESSURE
OD: 27
OS: 16

## 2021-03-16 ENCOUNTER — ADULT PHYSICAL (OUTPATIENT)
Dept: URBAN - METROPOLITAN AREA CLINIC 24 | Facility: CLINIC | Age: 66
End: 2021-03-16
Payer: MEDICARE

## 2021-03-16 DIAGNOSIS — Z01.818 ENCOUNTER FOR OTHER PREPROCEDURAL EXAMINATION: Primary | ICD-10-CM

## 2021-03-16 PROCEDURE — 99213 OFFICE O/P EST LOW 20 MIN: CPT | Performed by: PHYSICIAN ASSISTANT

## 2021-03-19 ENCOUNTER — SURGERY (OUTPATIENT)
Dept: URBAN - METROPOLITAN AREA SURGERY 12 | Facility: SURGERY | Age: 66
End: 2021-03-19
Payer: MEDICARE

## 2021-03-19 PROCEDURE — 66174 TRLUML DIL AQ O/F CAN W/O ST: CPT | Performed by: OPHTHALMOLOGY

## 2021-03-20 ENCOUNTER — POST OP (OUTPATIENT)
Dept: URBAN - METROPOLITAN AREA CLINIC 10 | Facility: CLINIC | Age: 66
End: 2021-03-20
Payer: MEDICARE

## 2021-03-20 PROCEDURE — 99024 POSTOP FOLLOW-UP VISIT: CPT | Performed by: OPTOMETRIST

## 2021-03-29 ENCOUNTER — POST OP (OUTPATIENT)
Dept: URBAN - METROPOLITAN AREA CLINIC 24 | Facility: CLINIC | Age: 66
End: 2021-03-29
Payer: MEDICARE

## 2021-03-29 PROCEDURE — 99024 POSTOP FOLLOW-UP VISIT: CPT | Performed by: OPTOMETRIST

## 2021-03-29 ASSESSMENT — INTRAOCULAR PRESSURE
OD: 8
OS: 15

## 2021-04-06 ENCOUNTER — OFFICE VISIT (OUTPATIENT)
Dept: URBAN - METROPOLITAN AREA CLINIC 24 | Facility: CLINIC | Age: 66
End: 2021-04-06
Payer: MEDICARE

## 2021-04-06 PROCEDURE — 99214 OFFICE O/P EST MOD 30 MIN: CPT | Performed by: OPHTHALMOLOGY

## 2021-04-06 PROCEDURE — 92134 CPTRZ OPH DX IMG PST SGM RTA: CPT | Performed by: OPHTHALMOLOGY

## 2021-04-06 ASSESSMENT — INTRAOCULAR PRESSURE
OD: 12
OS: 14

## 2021-04-06 NOTE — IMPRESSION/PLAN
Impression: Primary open-angle glaucoma, severe stage, left eye  Plan: cont drops as directed by Dr Zulema Mayers, keep f/u appt

## 2021-04-06 NOTE — IMPRESSION/PLAN
Impression: Cystoid macular degeneration, bilateral (sutured IOL OU) Plan: trace CME OU, will observe for now RTC: 6 weeks

## 2021-04-23 ENCOUNTER — POST-OPERATIVE VISIT (OUTPATIENT)
Dept: URBAN - METROPOLITAN AREA CLINIC 24 | Facility: CLINIC | Age: 66
End: 2021-04-23
Payer: MEDICARE

## 2021-04-23 PROCEDURE — 99024 POSTOP FOLLOW-UP VISIT: CPT | Performed by: OPHTHALMOLOGY

## 2021-04-23 ASSESSMENT — INTRAOCULAR PRESSURE
OS: 14
OD: 14

## 2021-04-23 NOTE — IMPRESSION/PLAN
Impression: Primary open-angle glaucoma, severe stage, left eye Trabeculectomy , MMC/poss tutoplast, MicroPulse OD, PPVIT/ reposition. suture IOL OD, PPVIT, MP +IVTA+KENALOG OD, VIT with IOL replacement OD, YAG OSx 2 , PPVIT R+R with suture IOL OD, Phaco PC IOL OU, MF IOL removal and replacement with standard lens OS
** SLT caused reoccurence  of CME OD*** Plan: Pt has Glaucoma Gonio: OD) cbb 1+ pg OS) cbb 2+ pg Pachs: 510/532     Today's IOP : 14 ou   Tmax & date :  52, 40 Target IOP low to mid teens Pt denies Fhx of Glaucoma Left eye is the better seeing eye HVF (1/21/2020) Nasal [place] OU
C/D: .95-.95 OU 
OCT: (1/21/2020) 55, 61 Pt denies Sulfa Allergy   // Pt denies Lung /Heart dx Plan :
1. Cont:
Timolol QAM OS Ilevro OU QDAY Pred TID OU Trial Stop Rhopressa OU QPM 1 week prior to return visit 2. Patient's IOP is stable 3.  Return to clinic in 2-3 weeks for IOP check

## 2021-05-18 ENCOUNTER — OFFICE VISIT (OUTPATIENT)
Dept: URBAN - METROPOLITAN AREA CLINIC 24 | Facility: CLINIC | Age: 66
End: 2021-05-18
Payer: MEDICARE

## 2021-05-18 PROCEDURE — 92134 CPTRZ OPH DX IMG PST SGM RTA: CPT | Performed by: OPHTHALMOLOGY

## 2021-05-18 PROCEDURE — 99214 OFFICE O/P EST MOD 30 MIN: CPT | Performed by: OPHTHALMOLOGY

## 2021-05-18 ASSESSMENT — INTRAOCULAR PRESSURE
OD: 18
OS: 6

## 2021-05-18 NOTE — IMPRESSION/PLAN
Impression: Primary open-angle glaucoma, severe stage, left eye Plan: cont drops as directed by Dr Opal Garcia, keep f/u appt

## 2021-05-18 NOTE — IMPRESSION/PLAN
Impression: Cystoid macular degeneration, bilateral (sutured IOL OU) Plan: resolved CME OU. monitor RTC: 2-3 months

## 2021-05-21 ENCOUNTER — OFFICE VISIT (OUTPATIENT)
Dept: URBAN - METROPOLITAN AREA CLINIC 24 | Facility: CLINIC | Age: 66
End: 2021-05-21
Payer: MEDICARE

## 2021-05-21 PROCEDURE — 99214 OFFICE O/P EST MOD 30 MIN: CPT | Performed by: OPHTHALMOLOGY

## 2021-05-21 ASSESSMENT — INTRAOCULAR PRESSURE
OS: 14
OD: 21

## 2021-05-21 NOTE — IMPRESSION/PLAN
Impression: Primary open-angle glaucoma, severe stage, left eye Trabeculectomy , MMC/poss tutoplast, MicroPulse OD, PPVIT/ reposition. suture IOL OD, PPVIT, MP +IVTA+KENALOG OD, VIT with IOL replacement OD, YAG OSx 2 , PPVIT R+R with suture IOL OD, Phaco PC IOL OU, MF IOL removal and replacement with standard lens OS ** SLT caused reoccurence  of CME OD*** Plan: Pt has Glaucoma Gonio: OD) cbb 1+ pg OS) cbb 2+ pg Pachs: 510/532     Today's IOP : 21(No Drops)/14    Tmax & date :  52, 44 Target IOP low to mid teens Pt denies Fhx of Glaucoma Left eye is the better seeing eye HVF (1/21/2020) Nasal [place] OU
C/D: .95-.95 OU 
OCT: (1/21/2020) 55, 61 Pt denies Sulfa Allergy   // Pt denies Lung /Heart dx Plan :
1. Cont: 
Timolol QAM OU (Changed to OU 5/21/21) Rhopressa QHS OS Ilevro OU QDAY 2. Patient's IOP is higher OD, Will add med.  
3. Return to clinic in 2 month IOP  for IOP check

## 2021-07-22 ENCOUNTER — OFFICE VISIT (OUTPATIENT)
Dept: URBAN - METROPOLITAN AREA CLINIC 24 | Facility: CLINIC | Age: 66
End: 2021-07-22
Payer: MEDICARE

## 2021-07-22 PROCEDURE — 99213 OFFICE O/P EST LOW 20 MIN: CPT | Performed by: OPHTHALMOLOGY

## 2021-07-22 ASSESSMENT — INTRAOCULAR PRESSURE
OS: 15
OD: 16

## 2021-07-22 NOTE — IMPRESSION/PLAN
Impression: Primary open-angle glaucoma, severe stage, left eye Trabeculectomy , MMC/poss tutoplast, MicroPulse OD, PPVIT/ reposition. suture IOL OD, PPVIT, MP +IVTA+KENALOG OD, VIT with IOL replacement OD, YAG OSx 2 , PPVIT R+R with suture IOL OD, Phaco PC IOL OU, MF IOL removal and replacement with standard lens OS ** SLT caused reoccurence  of CME OD*** Plan: Pt has Glaucoma Gonio: OD) cbb 1+ pg OS) cbb 2+ pg Pachs: 510/532     Today's IOP : 16/15   Tmax & date :  52, 44 Target IOP low to mid teens Pt denies Fhx of Glaucoma Left eye is the better seeing eye HVF (1/21/2020) Nasal [place] OU
C/D: .95-.95 OU 
OCT: (1/21/2020) 55, 61 Pt denies Sulfa Allergy   // Pt denies Lung /Heart dx Plan :
1. Cont: 
Timolol QAM OU (Changed to OU 5/21/21) Rhopressa QHS OS Ilevro OU QDAY 2. Patient's IOP is improved 3.  Return to clinic in 2-3 month IOP  for IOP check/HVF

## 2021-08-24 ENCOUNTER — OFFICE VISIT (OUTPATIENT)
Dept: URBAN - METROPOLITAN AREA CLINIC 24 | Facility: CLINIC | Age: 66
End: 2021-08-24
Payer: MEDICARE

## 2021-08-24 PROCEDURE — 99214 OFFICE O/P EST MOD 30 MIN: CPT | Performed by: OPHTHALMOLOGY

## 2021-08-24 RX ORDER — NETARSUDIL 0.2 MG/ML
0.02 % SOLUTION/ DROPS OPHTHALMIC; TOPICAL
Qty: 10 | Refills: 3 | Status: ACTIVE
Start: 2021-08-24

## 2021-08-24 RX ORDER — NETARSUDIL 0.2 MG/ML
0.02 % SOLUTION/ DROPS OPHTHALMIC; TOPICAL
Qty: 10 | Refills: 3 | Status: INACTIVE
Start: 2021-08-24 | End: 2021-08-24

## 2021-08-24 ASSESSMENT — INTRAOCULAR PRESSURE
OS: 18
OD: 17

## 2021-08-24 NOTE — IMPRESSION/PLAN
Impression: Cystoid macular degeneration, bilateral (sutured IOL OU) Plan: mild medicamentosa causing glare symptoms. CME remains resolved OU. d/c illevro, cont PF. cont to monitor RTC:2 months

## 2021-08-24 NOTE — IMPRESSION/PLAN
Impression: Primary open-angle glaucoma, severe stage, left eye Plan: cont drops as directed by Dr Toshia Goode

## 2021-10-19 ENCOUNTER — OFFICE VISIT (OUTPATIENT)
Dept: URBAN - METROPOLITAN AREA CLINIC 24 | Facility: CLINIC | Age: 66
End: 2021-10-19
Payer: MEDICARE

## 2021-10-19 DIAGNOSIS — H18.223 IDIOPATHIC CORNEAL EDEMA, BILATERAL: ICD-10-CM

## 2021-10-19 DIAGNOSIS — H40.1123 PRIMARY OPEN-ANGLE GLAUCOMA, LEFT EYE, SEVERE STAGE: ICD-10-CM

## 2021-10-19 PROCEDURE — 92134 CPTRZ OPH DX IMG PST SGM RTA: CPT | Performed by: OPHTHALMOLOGY

## 2021-10-19 PROCEDURE — 99214 OFFICE O/P EST MOD 30 MIN: CPT | Performed by: OPHTHALMOLOGY

## 2021-10-19 RX ORDER — NEPAFENAC 3 MG/ML
0.3 % SUSPENSION/ DROPS OPHTHALMIC
Qty: 5 | Refills: 3 | Status: INACTIVE
Start: 2021-10-19 | End: 2021-12-16

## 2021-10-19 ASSESSMENT — INTRAOCULAR PRESSURE
OD: 13
OS: 11

## 2021-10-19 NOTE — IMPRESSION/PLAN
Impression: Cystoid macular degeneration, bilateral (sutured IOL OU) Plan: mild medicamentosa causing glare symptoms. CME remains resolved OD, trace increase OS. will cont PF QD OU and restart ilevro 1x every other day OS. monitor RTC 2 months

## 2021-10-19 NOTE — IMPRESSION/PLAN
Impression: Primary open-angle glaucoma, severe stage, left eye Plan: cont drops as directed by Dr Johny Herrera

## 2021-10-20 ENCOUNTER — OFFICE VISIT (OUTPATIENT)
Dept: URBAN - METROPOLITAN AREA CLINIC 24 | Facility: CLINIC | Age: 66
End: 2021-10-20
Payer: MEDICARE

## 2021-10-20 DIAGNOSIS — H02.539 EYELID RETRACTION: ICD-10-CM

## 2021-10-20 PROCEDURE — 92083 EXTENDED VISUAL FIELD XM: CPT | Performed by: OPHTHALMOLOGY

## 2021-10-20 PROCEDURE — 92133 CPTRZD OPH DX IMG PST SGM ON: CPT | Performed by: OPHTHALMOLOGY

## 2021-10-20 PROCEDURE — 99213 OFFICE O/P EST LOW 20 MIN: CPT | Performed by: OPHTHALMOLOGY

## 2021-10-20 ASSESSMENT — INTRAOCULAR PRESSURE
OD: 18
OS: 15

## 2021-10-20 NOTE — IMPRESSION/PLAN
Impression: Primary open-angle glaucoma, severe stage, left eye Trabeculectomy , MMC/poss tutoplast, MicroPulse OD, PPVIT/ reposition. suture IOL OD, PPVIT, MP +IVTA+KENALOG OD, VIT with IOL replacement OD, YAG OSx 2 , PPVIT R+R with suture IOL OD, Phaco PC IOL OU, MF IOL removal and replacement with standard lens OS ** SLT caused reoccurence  of CME OD*** Plan: Pt has Glaucoma Gonio: OD) cbb 1+ pg OS) cbb 2+ pg Pachs: 510/532     Today's IOP : 18/15   Tmax & date :  52, 44 Target IOP low to mid teens Pt denies Fhx of Glaucoma Left eye is the better seeing eye HVF (10/20/21)  OD: inferior wil field loss with superior central loss OS:  dense superior nasal loss with inferior scotoma. C/D: .95-.95 OU 
OCT: (10/20/21) 52/50 Pt denies Sulfa Allergy   // Pt denies Lung /Heart dx Plan :
1. Cont: 
Timolol QAM OU (Changed to OU 5/21/21) Rhopressa QHS OS Ilevro OU QDAY 2. Patient's IOP is stable OU today and HVF stable today 3.  Return to clinic in 3 month IOP  for IOP check

## 2021-10-20 NOTE — IMPRESSION/PLAN
Impression: Idiopathic corneal edema, bilateral: S14.894.  Plan: Per Dr. Timoteo Forbes: recommend consult Dr Bladimir Lambert

## 2021-10-20 NOTE — IMPRESSION/PLAN
Impression: Eyelid retraction: H02.539. Plan: Discussed with patient lids do not sit well inferior nasal.  Pt may benefit from plastic surgery to reappose eyelid to globe. This may avoid additional intraorbital surgery which is high risk given the amount of glc loss. Patient to proceed with consult with Dr. Cindy Montano later today. If she agrees, please refer pt to plastics surgery. 

Informed patient he may need to see plastics - will leave up to Dr. Cindy Montano

## 2021-10-20 NOTE — IMPRESSION/PLAN
Impression: Cystoid macular degeneration, bilateral (sutured IOL OU) Plan: mild medicamentosa causing glare symptoms. CME remains resolved OD, trace increase OS. will cont PF QD OU and restart ilevro 1x every other day OS. monitor Continue care with Dr. Sully Nayak

## 2021-11-03 ENCOUNTER — OFFICE VISIT (OUTPATIENT)
Dept: URBAN - METROPOLITAN AREA CLINIC 10 | Facility: CLINIC | Age: 66
End: 2021-11-03
Payer: MEDICARE

## 2021-11-03 PROCEDURE — 92286 ANT SGM IMG I&R SPECLR MIC: CPT | Performed by: OPHTHALMOLOGY

## 2021-11-03 PROCEDURE — 76514 ECHO EXAM OF EYE THICKNESS: CPT | Performed by: OPHTHALMOLOGY

## 2021-11-03 PROCEDURE — 99214 OFFICE O/P EST MOD 30 MIN: CPT | Performed by: OPHTHALMOLOGY

## 2021-11-03 PROCEDURE — 92025 CPTRIZED CORNEAL TOPOGRAPHY: CPT | Performed by: OPHTHALMOLOGY

## 2021-11-03 RX ORDER — TIMOLOL MALEATE 6.8 MG/ML
0.5 % SOLUTION OPHTHALMIC
Qty: 180 | Refills: 1 | Status: ACTIVE
Start: 2021-11-03

## 2021-11-03 RX ORDER — CYCLOSPORINE 0 G/ML
0.09 % SOLUTION/ DROPS OPHTHALMIC; TOPICAL
Qty: 180 | Refills: 1 | Status: ACTIVE
Start: 2021-11-03

## 2021-11-03 ASSESSMENT — INTRAOCULAR PRESSURE
OS: 9
OD: 10

## 2021-11-03 ASSESSMENT — VISUAL ACUITY
OD: 20/20
OS: 20/100

## 2021-11-03 NOTE — IMPRESSION/PLAN
Impression: Primary open-angle glaucoma, severe stage, left eye Trabeculectomy , MMC/poss tutoplast, MicroPulse OD, PPVIT/ reposition. suture IOL OD, PPVIT, MP +IVTA+KENALOG OD, VIT with IOL replacement OD, YAG OSx 2 , PPVIT R+R with suture IOL OD, Phaco PC IOL OU, MF IOL removal and replacement with standard lens OS ** SLT caused reoccurence  of CME OD*** Plan: 1. Cont care with Dr. Dwayne Scheuermann Switch to PF Timolol QAM OU (Changed to OU 5/21/21) Rhopressa QHS OS Ilevro OU QDAY

## 2021-11-03 NOTE — IMPRESSION/PLAN
Impression: Punctate keratitis, bilateral: H16.143.
- Has tried and failed ATs, PFATs, restasis, Xiidra Plan: The patient is noted to have moderate keratoconjunctivitis sicca. May also have an element of toxic keratitis. The condition has been explained to the patient. Recommend preservative free Artificial Tear drops q1-2hr w/a, Artificial Tear lizzy QHS OU and environment changes. Start Cequa BID OU. Punctal plugs placed BLL ultraplug OD 0.4, OS 0.5. Will switch to preservative free timolol qAM OU.  
atient expressed understanding.   
If no improvement will consider Prokera (may be difficult OD dt bleb) or serum tears

## 2021-11-03 NOTE — IMPRESSION/PLAN
Impression: Cystoid macular degeneration, bilateral (sutured IOL OU) Plan: Per Dr. Demetria Farris: cont PF QD OU and restart ilevro 1x every other day OS.

## 2021-12-16 ENCOUNTER — OFFICE VISIT (OUTPATIENT)
Dept: URBAN - METROPOLITAN AREA CLINIC 24 | Facility: CLINIC | Age: 66
End: 2021-12-16
Payer: MEDICARE

## 2021-12-16 PROCEDURE — 99213 OFFICE O/P EST LOW 20 MIN: CPT | Performed by: OPHTHALMOLOGY

## 2021-12-16 ASSESSMENT — INTRAOCULAR PRESSURE
OD: 14
OS: 15

## 2021-12-16 NOTE — IMPRESSION/PLAN
Impression: Cystoid macular degeneration, bilateral (sutured IOL OU) Plan: Per Dr. Demetria Farris: cont PF QD OU and restart ilevro 1x every other day OS. Physical Therapy  Visit Type: initial evaluation  Precautions:  Medical precautions:  fall risk;.   Lines:     Basic: telemetry and capped IV    SUBJECTIVE  Patient agreed to participate in therapy this date.  \"I'm going to stop by the bathroom while I'm up.\"  Patient / Family Goal: return to previous functional status    Pain   RN informed on pain level     OBJECTIVE     Oriented to person, place and time     Arousal alertness: appropriate responses to stimuli    Affect/Behavior: alert, calm, cooperative and flat  Patient activity tolerance: 2 to 1 activity to rest  Functional Communication/Cognition    Overall status:  Within functional limits    Form of communication:  Verbal   Attention span:  Appears intact    Commands: follows all commands and directions consistently.  Strength (out of 5 unless otherwise indicated)   Hip:  Hip Flexion: Right: 3+  Knee:   - Extension:      • Right: 3+  Ankle:    - Dorsiflexion:      • Left: 4      • Right: 4    Bed Mobility:        Supine to sit: supervision  Transfers:    Assistive devices: 2-wheeled walker    Sit to stand: contact guard/touching/steadying assist    Stand to sit: contact guard/touching/steadying assist  Gait/Ambulation:     Assistance: contact guard/touching/steadying assist   Assistive device: 2-wheeled walker    Distance (ft): 15    Type: antalgic, decreased kenji, shuffling and unsteady  Training Completed:    Tasks: gait training on level surfaces            ASSESSMENT    Impairments: range of motion, strength, balance deficits, pain, coordination, activity tolerance and endurance  Functional Limitations: all functional mobility  Pt admitted with weakness and abdominal pain, evaluated in 249-1.  Reports being Mod I with rollator, currently a  pt.  At [facility], pt is CGA for standing activity with a RW.  Demonstrates weakness, decreased coordination, limited activity tolerance, impaired LE strength and moderate gait deficits.         Discharge  Recommendations   Recommendation for Discharge: PT IL: Patient needs nondaily skilled therapy after discharge            PT/OT ADL Equipment for Discharge: None        Skilled therapy is required to address these limitations in attempt to maximize the patient's independence.  Progress: progressing toward goals  Predicted patient presentation: Low (stable) - Patient comorbidities and complexities, as defined above, will have little effect on progress for prescribed plan of care.    End of Session:   Location end of session: on BSC with PCA in room.  Handoff to: nurse assistant    PLAN    Suggestions for next session as indicated: PT Frequency: 3 days/week  Frequency Comments: MG, 3x/week, MG, 1/3      Interventions: balance, bed mobility, energy conservation, functional transfer training and gait training  Agreement to plan and goals: patient agrees with goals and treatment plan        GOALS  Long Term Goals: (to be met by time of discharge from hospital)  Sit to stand: Patient will complete sit to stand transfer with 2-wheeled walker, modified independent.   Stand to sit: Patient will complete stand to sit transfer with 2-wheeled walker, modified independent.   Ambulation (even): Patient will ambulate on even surface for 100 feet with 2-wheeled walker, contact guard or touching/steadying.     Documented in the chart in the following areas: Prior Level of Function. Pain. Assessment.      Therapy procedure time and total treatment time can be found documented on the Time Entry flowsheet

## 2021-12-16 NOTE — IMPRESSION/PLAN
Impression: Primary open-angle glaucoma, severe stage, left eye Trabeculectomy , MMC/poss tutoplast, MicroPulse OD, PPVIT/ reposition. suture IOL OD, PPVIT, MP +IVTA+KENALOG OD, VIT with IOL replacement OD, YAG OSx 2 , PPVIT R+R with suture IOL OD, Phaco PC IOL OU, MF IOL removal and replacement with standard lens OS ** SLT caused reoccurence  of CME OD*** Plan: 1. Cont care with Dr. Evangelista Daniels Cont PF Timolol QAM OU (Changed to OU 5/21/21) Rhopressa QHS OS Ilevro OU QDAY

## 2022-01-25 ENCOUNTER — OFFICE VISIT (OUTPATIENT)
Dept: URBAN - METROPOLITAN AREA CLINIC 24 | Facility: CLINIC | Age: 67
End: 2022-01-25
Payer: MEDICARE

## 2022-01-25 DIAGNOSIS — H35.373 PUCKERING OF MACULA, BILATERAL: ICD-10-CM

## 2022-01-25 PROCEDURE — 99214 OFFICE O/P EST MOD 30 MIN: CPT | Performed by: OPHTHALMOLOGY

## 2022-01-25 PROCEDURE — 92134 CPTRZ OPH DX IMG PST SGM RTA: CPT | Performed by: OPHTHALMOLOGY

## 2022-01-25 ASSESSMENT — INTRAOCULAR PRESSURE
OS: 16
OD: 15

## 2022-01-25 NOTE — IMPRESSION/PLAN
Impression: Punctate keratitis, bilateral: H16.143.
- Has tried and failed ATs, PFATs, restasis, Ernetso Amen - Punctal plugs BLL ultraplug OD 0.4, OS 0.5.  Plan: no plug seen OD. cont Cequa BID OU and PF timolol qAM OU. may benefit from consult in Dry eye clinic

## 2022-01-25 NOTE — IMPRESSION/PLAN
Impression: Cystoid macular degeneration, bilateral (sutured IOL OU) Plan: CME resolved OU. disc options, will cont PF QD OU and ilevro 1x every other day OS. monitor RTC 3 months

## 2022-01-25 NOTE — IMPRESSION/PLAN
Impression: Primary open-angle glaucoma, severe stage, left eye Plan: cont drops as directed by Dr Christina Dakins

## 2022-01-26 ENCOUNTER — OFFICE VISIT (OUTPATIENT)
Dept: URBAN - METROPOLITAN AREA CLINIC 24 | Facility: CLINIC | Age: 67
End: 2022-01-26
Payer: MEDICARE

## 2022-01-26 PROCEDURE — 99214 OFFICE O/P EST MOD 30 MIN: CPT | Performed by: OPHTHALMOLOGY

## 2022-01-26 ASSESSMENT — INTRAOCULAR PRESSURE
OD: 11
OS: 11

## 2022-01-26 NOTE — IMPRESSION/PLAN
Impression: Cystoid macular degeneration, bilateral (sutured IOL OU) Plan: mild medicamentosa causing glare symptoms. CME remains resolved OD, trace increase OS. will cont PF QD OU and restart ilevro 1x every other day OS. monitor Continue care with Dr. Ashford Bodily

## 2022-01-26 NOTE — IMPRESSION/PLAN
Impression: Primary open-angle glaucoma, severe stage, left eye Trabeculectomy , MMC/poss tutoplast, MicroPulse OD, PPVIT/ reposition. suture IOL OD, PPVIT, MP +IVTA+KENALOG OD, VIT with IOL replacement OD, YAG OSx 2 , PPVIT R+R with suture IOL OD, Phaco PC IOL OU, MF IOL removal and replacement with standard lens OS ** SLT caused reoccurence  of CME OD*** Plan: Pt has Glaucoma Gonio: OD) cbb 1+ pg OS) cbb 2+ pg Pachs: 510/532     Today's IOP : 11 ou   Tmax & date :  52, 40 Target IOP low to mid teens Pt denies Fhx of Glaucoma Left eye is the better seeing eye HVF (10/20/21)  OD: inferior wil field loss with superior central loss OS:  dense superior nasal loss with inferior scotoma. C/D: .95-.95 OU 
OCT: (10/20/21) 52/50 Pt denies Sulfa Allergy // Pt denies Lung /Heart dx Plan :
1. Continue: 
Timolol QAM OU (Changed to OU 5/21/21) Rhopressa QHS OS Ilevro OS QDAY 2. Patient's IOP is stable OU today and HVF stable today 3.  Return to clinic in 4 month IOP  for IOP check

## 2022-01-26 NOTE — IMPRESSION/PLAN
Impression: Punctate keratitis, bilateral: H16.143. Plan: Dry eyes account for the patient's complaints. There is no evidence of permanent changes to the cornea. Explained condition does not have a cure and will need artificial tears for maintenance. 

Put a .5 plug in OD today 01/26/22

## 2022-04-19 ENCOUNTER — OFFICE VISIT (OUTPATIENT)
Dept: URBAN - METROPOLITAN AREA CLINIC 24 | Facility: CLINIC | Age: 67
End: 2022-04-19
Payer: MEDICARE

## 2022-04-19 DIAGNOSIS — H40.1123 PRIMARY OPEN-ANGLE GLAUCOMA, LEFT EYE, SEVERE STAGE: ICD-10-CM

## 2022-04-19 DIAGNOSIS — H35.353 CYSTOID MACULAR DEGENERATION, BILATERAL: Primary | ICD-10-CM

## 2022-04-19 DIAGNOSIS — H16.143 PUNCTATE KERATITIS, BILATERAL: ICD-10-CM

## 2022-04-19 DIAGNOSIS — H35.373 PUCKERING OF MACULA, BILATERAL: ICD-10-CM

## 2022-04-19 PROCEDURE — 99214 OFFICE O/P EST MOD 30 MIN: CPT | Performed by: OPHTHALMOLOGY

## 2022-04-19 PROCEDURE — 92134 CPTRZ OPH DX IMG PST SGM RTA: CPT | Performed by: OPHTHALMOLOGY

## 2022-04-19 ASSESSMENT — INTRAOCULAR PRESSURE
OS: 18
OD: 18

## 2022-04-19 NOTE — IMPRESSION/PLAN
Impression: Punctate keratitis, bilateral: H16.143. Plan: Dry eyes account for the patient's complaints. AFTS QID+, warm compresses daily.  consult cornea N/A

## 2022-04-19 NOTE — IMPRESSION/PLAN
Impression: Cystoid macular degeneration, bilateral (sutured IOL OU) Plan: CME resolved OD, trace increase OS. disc options, use PF QID OU x 1 week, BID x 1 week, QD  x 1 week, then back to PF and ilevro 1x every other day OS. reeval in 6 weeks RTC 6 weeks

## 2022-04-19 NOTE — IMPRESSION/PLAN
Impression: Primary open-angle glaucoma, severe stage, left eye Plan: cont drops as directed by Dr Rose Mary Oliveira

## 2022-05-19 ENCOUNTER — OFFICE VISIT (OUTPATIENT)
Dept: URBAN - METROPOLITAN AREA CLINIC 24 | Facility: CLINIC | Age: 67
End: 2022-05-19
Payer: MEDICARE

## 2022-05-19 DIAGNOSIS — H40.1123 PRIMARY OPEN-ANGLE GLAUCOMA, LEFT EYE, SEVERE STAGE: Primary | ICD-10-CM

## 2022-05-19 DIAGNOSIS — H18.223 IDIOPATHIC CORNEAL EDEMA, BILATERAL: ICD-10-CM

## 2022-05-19 DIAGNOSIS — H40.1112 PRIMARY OPEN-ANGLE GLAUCOMA, RIGHT EYE, MODERATE STAGE: ICD-10-CM

## 2022-05-19 DIAGNOSIS — H35.353 CYSTOID MACULAR DEGENERATION, BILATERAL: ICD-10-CM

## 2022-05-19 PROCEDURE — 99213 OFFICE O/P EST LOW 20 MIN: CPT | Performed by: OPHTHALMOLOGY

## 2022-05-19 ASSESSMENT — INTRAOCULAR PRESSURE
OD: 19
OS: 15

## 2022-05-19 NOTE — IMPRESSION/PLAN
Impression: Primary open-angle glaucoma, severe stage, left eye Trabeculectomy , MMC/poss tutoplast, MicroPulse OD, PPVIT/ reposition. suture IOL OD, PPVIT, MP +IVTA+KENALOG OD, VIT with IOL replacement OD, YAG OSx 2 , PPVIT R+R with suture IOL OD, Phaco PC IOL OU, MF IOL removal and replacement with standard lens OS ** SLT caused Reoccurence  of CME OD*** Plan: Pt has Glaucoma            Gonio: OD) cbb 1+ pg OS) cbb 2+ pg Pachs: 510/532     Today's IOP : 19/15   Tmax & date :  47, 44 Target IOP low to mid teens Pt denies Fhx of Glaucoma Left eye is the better seeing eye HVF (10/20/21)  OD: inferior wil field loss with superior central loss OS:  dense superior nasal loss with inferior scotoma. C/D: .95-.95 OU 
OCT: (10/20/21) 52/50 Pt denies Sulfa Allergy // Pt denies Lung /Heart dx Plan :
1. Continue: 
Timolol PF QAM OU (Changed to OU 5/21/21) Rhopressa QHS OS Ilevro OU QDAY (using every 2-3 days) Pred QD OU (started by Dr. Nicola Gr) 2. Patient's IOP remains stable OU today 3. Return to clinic in 2 month IOP check with HVF 4.  Return to Dr. Luis Pickens as scheduled

## 2022-05-19 NOTE — IMPRESSION/PLAN
Impression: Cystoid macular degeneration, bilateral (sutured IOL OU) Plan: mild medicamentosa causing glare symptoms. CME remains resolved OD, trace increase OS. will cont PF QD OU and restart ilevro 1x every other day OS. monitor Continue care with Dr. Eleni Muñiz

## 2022-07-14 ENCOUNTER — OFFICE VISIT (OUTPATIENT)
Dept: URBAN - METROPOLITAN AREA CLINIC 24 | Facility: CLINIC | Age: 67
End: 2022-07-14
Payer: MEDICARE

## 2022-07-14 DIAGNOSIS — H35.353 CYSTOID MACULAR DEGENERATION, BILATERAL: ICD-10-CM

## 2022-07-14 DIAGNOSIS — H40.1123 PRIMARY OPEN-ANGLE GLAUCOMA, LEFT EYE, SEVERE STAGE: Primary | ICD-10-CM

## 2022-07-14 DIAGNOSIS — H18.223 IDIOPATHIC CORNEAL EDEMA, BILATERAL: ICD-10-CM

## 2022-07-14 DIAGNOSIS — H40.1112 PRIMARY OPEN-ANGLE GLAUCOMA, RIGHT EYE, MODERATE STAGE: ICD-10-CM

## 2022-07-14 PROCEDURE — 92083 EXTENDED VISUAL FIELD XM: CPT | Performed by: OPHTHALMOLOGY

## 2022-07-14 PROCEDURE — 99213 OFFICE O/P EST LOW 20 MIN: CPT | Performed by: OPHTHALMOLOGY

## 2022-07-14 ASSESSMENT — INTRAOCULAR PRESSURE
OS: 16
OD: 17

## 2022-07-14 NOTE — IMPRESSION/PLAN
Impression: Primary open-angle glaucoma, severe stage, left eye Trabeculectomy , MMC/poss tutoplast, MicroPulse OD, PPVIT/ reposition. suture IOL OD, PPVIT, MP +IVTA+KENALOG OD, VIT with IOL replacement OD, YAG OSx 2 , PPVIT R+R with suture IOL OD, Phaco PC IOL OU, MF IOL removal and replacement with standard lens OS ** SLT caused Reoccurence  of CME OD*** Plan: Pt has Glaucoma            Gonio: OD) cbb 1+ pg OS) cbb 2+ pg Pachs: 510/532     Today's IOP : 17/16   Tmax & date :  47, 44 Target IOP low to mid teens Pt denies Fhx of Glaucoma Left eye is the better seeing eye HVF (10/20/21)  OD: inferior wil field loss with superior central loss OS:  dense superior nasal loss with inferior scotoma. C/D: .95-.95 OU 
OCT: (10/20/21) 52/50 Pt denies Sulfa Allergy // Pt denies Lung /Heart dx Plan :
1. Continue: 
Timolol PF QAM OU (Changed to OU 5/21/21) Rhopressa QHS OS Ilevro OU QDAY (using every 2-3 days) Pred QD OU (started by Dr. Makeda Gary) 2. Patient's IOP remains stable OU today 3. Stable/Improved HVF OU today 4.  Return in 3 months with Dr. Linette Kruse for IOP check and 6 months with Dr. Shelley Bingham HVF/RNFL

## 2022-07-14 NOTE — IMPRESSION/PLAN
Impression: Cystoid macular degeneration, bilateral (sutured IOL OU) Plan: mild medicamentosa causing glare symptoms. CME remains resolved OD, trace increase OS. will cont PF QD OU and restart ilevro 1x every other day OS. monitor Continue care with Dr. Doc Joy

## 2022-07-22 ENCOUNTER — OFFICE VISIT (OUTPATIENT)
Dept: URBAN - METROPOLITAN AREA CLINIC 24 | Facility: CLINIC | Age: 67
End: 2022-07-22
Payer: MEDICARE

## 2022-07-22 DIAGNOSIS — H40.1123 PRIMARY OPEN-ANGLE GLAUCOMA, LEFT EYE, SEVERE STAGE: ICD-10-CM

## 2022-07-22 DIAGNOSIS — H16.143 PUNCTATE KERATITIS, BILATERAL: Primary | ICD-10-CM

## 2022-07-22 DIAGNOSIS — H02.005 ENTROPION OF LEFT LOWER LID: ICD-10-CM

## 2022-07-22 DIAGNOSIS — H35.353 CYSTOID MACULAR DEGENERATION, BILATERAL: ICD-10-CM

## 2022-07-22 PROCEDURE — 99213 OFFICE O/P EST LOW 20 MIN: CPT | Performed by: OPHTHALMOLOGY

## 2022-07-22 PROCEDURE — 68761 CLOSE TEAR DUCT OPENING: CPT | Performed by: OPHTHALMOLOGY

## 2022-07-22 RX ORDER — CYCLOSPORINE 0 G/ML
0.09 % SOLUTION/ DROPS OPHTHALMIC; TOPICAL
Qty: 180 | Refills: 3 | Status: ACTIVE
Start: 2022-07-22

## 2022-07-22 NOTE — IMPRESSION/PLAN
Impression: Cystoid macular degeneration, bilateral (sutured IOL OU) Plan: Under the care of Dr. Toñito Monterroso

## 2022-07-22 NOTE — IMPRESSION/PLAN
Impression: Primary open-angle glaucoma, severe stage, left eye Plan: cont drops as directed by Dr Leonor Forte

## 2022-07-22 NOTE — IMPRESSION/PLAN
Impression: Primary open-angle glaucoma, severe stage, left eye Trabeculectomy , MMC/poss tutoplast, MicroPulse OD, PPVIT/ reposition. suture IOL OD, PPVIT, MP +IVTA+KENALOG OD, VIT with IOL replacement OD, YAG OSx 2 , PPVIT R+R with suture IOL OD, Phaco PC IOL OU, MF IOL removal and replacement with standard lens OS ** SLT caused Reoccurence  of CME OD*** Plan: Under the care of Dr. Opal Garcia. 1. Continue: 
Timolol PF QAM OU (Changed to OU 5/21/21) Rhopressa QHS OS Ilevro OU QDAY (using every 2-3 days) Pred QD OU (started by Dr. Shayy Drake)

## 2022-07-22 NOTE — IMPRESSION/PLAN
Impression: Punctate keratitis, bilateral: H16.143. PP in place BLL Plan: OD>OS. Mild. Also with mild LSCD superiorly. Advise against Prokera given elevated bleb OD. Currently on PF glc gtts. Continue lubrication: PFATs q1-2hr w/a, gel or lizzy qhs OU. PP placed BUL today Cont Cequa OU BID. Start ASEDs QID May be seen in dry eye clinic for further care. No acute corneal issues.

## 2022-07-22 NOTE — IMPRESSION/PLAN
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 Plan: Also with floppy eyelids. Pt requests oculoplastics consult.

## 2022-08-09 ENCOUNTER — OFFICE VISIT (OUTPATIENT)
Dept: URBAN - METROPOLITAN AREA CLINIC 24 | Facility: CLINIC | Age: 67
End: 2022-08-09
Payer: MEDICARE

## 2022-08-09 DIAGNOSIS — H35.353 CYSTOID MACULAR DEGENERATION, BILATERAL: Primary | ICD-10-CM

## 2022-08-09 DIAGNOSIS — H16.143 PUNCTATE KERATITIS, BILATERAL: ICD-10-CM

## 2022-08-09 DIAGNOSIS — H40.1123 PRIMARY OPEN-ANGLE GLAUCOMA, LEFT EYE, SEVERE STAGE: ICD-10-CM

## 2022-08-09 DIAGNOSIS — H35.373 PUCKERING OF MACULA, BILATERAL: ICD-10-CM

## 2022-08-09 PROCEDURE — 92134 CPTRZ OPH DX IMG PST SGM RTA: CPT | Performed by: OPHTHALMOLOGY

## 2022-08-09 PROCEDURE — 99214 OFFICE O/P EST MOD 30 MIN: CPT | Performed by: OPHTHALMOLOGY

## 2022-08-09 RX ORDER — NEPAFENAC 3 MG/ML
0.3 % SUSPENSION/ DROPS OPHTHALMIC
Qty: 5 | Refills: 2 | Status: ACTIVE
Start: 2022-08-09

## 2022-08-09 ASSESSMENT — INTRAOCULAR PRESSURE
OD: 13
OS: 13

## 2022-08-09 NOTE — IMPRESSION/PLAN
Impression: Primary open-angle glaucoma, severe stage, left eye Plan: cont Timolol and Rhopressa as directed

## 2022-08-09 NOTE — IMPRESSION/PLAN
Impression: Cystoid macular degeneration, bilateral (sutured IOL OU) Plan: CME resolved OD, trace OS. disc options, has been using PF BID OU and ilevro 1x every day OS. recommend cont drops as directed. monitor RTC 3-4 months

## 2022-08-25 ENCOUNTER — OFFICE VISIT (OUTPATIENT)
Dept: URBAN - METROPOLITAN AREA CLINIC 30 | Facility: CLINIC | Age: 67
End: 2022-08-25
Payer: MEDICARE

## 2022-08-25 DIAGNOSIS — H00.024 HORDEOLUM INTERNUM LEFT UPPER LID: ICD-10-CM

## 2022-08-25 DIAGNOSIS — H00.021 HORDEOLUM INTERNUM RIGHT UPPER LID: ICD-10-CM

## 2022-08-25 DIAGNOSIS — H16.143 PUNCTATE KERATITIS, BILATERAL: Primary | ICD-10-CM

## 2022-08-25 DIAGNOSIS — H40.1123 PRIMARY OPEN-ANGLE GLAUCOMA, LEFT EYE, SEVERE STAGE: ICD-10-CM

## 2022-08-25 PROCEDURE — 83861 MICROFLUID ANALY TEARS: CPT | Performed by: OPTOMETRIST

## 2022-08-25 PROCEDURE — 0330T TEAR FILM IMG UNI/BI W/I&R: CPT | Performed by: OPTOMETRIST

## 2022-08-25 RX ORDER — TIMOLOL MALEATE 6.8 MG/ML
0.5 % SOLUTION OPHTHALMIC
Qty: 180 | Refills: 1 | Status: ACTIVE
Start: 2022-08-25

## 2022-08-25 ASSESSMENT — INTRAOCULAR PRESSURE
OS: 14
OD: 13

## 2022-08-25 NOTE — IMPRESSION/PLAN
Impression: Hordeolum internum right upper lid: H00.021. Plan: Start WC's qhs OU. Consider Erythromycin/ Maxitrol lizzy qhs OU to the lids.

## 2022-08-25 NOTE — IMPRESSION/PLAN
Impression: Primary open-angle glaucoma, severe stage, left eye Trabeculectomy , MMC/poss tutoplast, MicroPulse OD, PPVIT/ reposition. suture IOL OD, PPVIT, MP +IVTA+KENALOG OD, VIT with IOL replacement OD, YAG OSx 2 , PPVIT R+R with suture IOL OD, Phaco PC IOL OU, MF IOL removal and replacement with standard lens OS ** SLT caused Reoccurence  of CME OD*** Plan: Under the care of Dr. Andrea Tapia. 1. Continue: 
Timolol PF QAM OU (Changed to OU 5/21/21) Rhopressa QHS OS Ilevro OU QDAY (using every 2-3 days) Pred BID OU (started by Dr. Holley Sherwood).

## 2022-08-25 NOTE — IMPRESSION/PLAN
Impression: Punctate keratitis, bilateral: H16.143. PP in place BLL
08/2022 OSMO [phone] [phone] 
08/2022 SCHIRMERS 21, 21
NI on Restasis  Plan: OD>OS. Referred by Dr. Cindy Montano. Also with mild LSCD superiorly OD. Small epithelial break superiorly OD. Previously advised against Prokera given elevated bleb OD. Pt notes glare OU, mostly indoors- may be DED related. Did not grade glands today. Currently on PF glc gtts. Continue lubrication: PFATs q1-2hr w/a, gel or lizzy qhs OU. Cont Cequa OU BID. Start ASEDs QID OU- pt has not picked up yet. No acute corneal issues. BUL plugs missing today. Consider BUL cautery if they continue to displace. 08/2022 placed BUL plugs c 0.4mm UltraPlugs. Consent form signed. DEC 08/2022. Pt ed of condition that DED tends to be chronic (there is no cure) and will take time to treat based on severity. Discussed in detail ADDE/EDGAR. Oculus Keratograph results indicate OD 60%, OS 60% MG atrophy. Discussed options for treatment such as IPL tx x 4 in order to maintain MG function. Pt aware of out of pocket cost $400. Statement Selected

## 2022-10-14 ENCOUNTER — OFFICE VISIT (OUTPATIENT)
Dept: URBAN - METROPOLITAN AREA CLINIC 24 | Facility: CLINIC | Age: 67
End: 2022-10-14
Payer: MEDICARE

## 2022-10-14 DIAGNOSIS — H40.1123 PRIMARY OPEN-ANGLE GLAUCOMA, LEFT EYE, SEVERE STAGE: Primary | ICD-10-CM

## 2022-10-14 PROCEDURE — 92012 INTRM OPH EXAM EST PATIENT: CPT | Performed by: OPTOMETRIST

## 2022-10-14 RX ORDER — PREDNISOLONE ACETATE 10 MG/ML
1 % SUSPENSION/ DROPS OPHTHALMIC
Qty: 15 | Refills: 1 | Status: ACTIVE
Start: 2022-10-14

## 2022-10-14 RX ORDER — NEPAFENAC 3 MG/ML
0.3 % SUSPENSION/ DROPS OPHTHALMIC
Qty: 5 | Refills: 2 | Status: ACTIVE
Start: 2022-10-14

## 2022-10-14 ASSESSMENT — INTRAOCULAR PRESSURE
OD: 16
OS: 16

## 2022-10-27 ENCOUNTER — OFFICE VISIT (OUTPATIENT)
Dept: URBAN - METROPOLITAN AREA CLINIC 30 | Facility: CLINIC | Age: 67
End: 2022-10-27
Payer: MEDICARE

## 2022-10-27 DIAGNOSIS — H00.024 HORDEOLUM INTERNUM LEFT UPPER LID: ICD-10-CM

## 2022-10-27 DIAGNOSIS — H16.143 PUNCTATE KERATITIS, BILATERAL: Primary | ICD-10-CM

## 2022-10-27 DIAGNOSIS — H02.005 ENTROPION OF LEFT LOWER LID: ICD-10-CM

## 2022-10-27 DIAGNOSIS — H00.021 HORDEOLUM INTERNUM RIGHT UPPER LID: ICD-10-CM

## 2022-10-27 PROCEDURE — 83861 MICROFLUID ANALY TEARS: CPT | Performed by: OPTOMETRIST

## 2022-10-27 PROCEDURE — 92133 CPTRZD OPH DX IMG PST SGM ON: CPT | Performed by: OPTOMETRIST

## 2022-10-27 ASSESSMENT — INTRAOCULAR PRESSURE
OS: 16
OD: 16

## 2022-10-27 NOTE — IMPRESSION/PLAN
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 Plan: [[Also with floppy eyelids. Pt requests oculoplastics consult.]] Consider either BUL or BLL punctal cautery if plugs displace.

## 2022-10-27 NOTE — IMPRESSION/PLAN
Impression: Hordeolum internum right upper lid: H00.021. Plan: Continue WC's qhs OU. Consider Erythromycin/ Maxitrol lizzy qhs OU to the lids.

## 2022-10-27 NOTE — IMPRESSION/PLAN
Impression: Punctate keratitis, bilateral: H16.143. PP in place BLL
10/2022 OSMO [id number]
08/2022 OSMO 301, [phone] 
08/2022 SCHIRMERS 21, 21
NI on Restasis Plan: OD>OS. Referred by Dr. Autumn Mcmahan. Also with mild LSCD superiorly OD. Small epithelial break superiorly OD. Previously advised against Prokera given elevated bleb OD. Pt notes glare OU, mostly indoors- may be DED related. Pt notes excessive tearing OU c BULs. Consider punctal cautery if BLL PP's continue to displace. PLAN: leave out LLL plug for now- there may be some inflammation/scar tissue. Currently on PF glc gtts. Continue lubrication: PFATs q1-2hr w/a, gel or lizzy qhs OU. Cont Cequa OU BID. Cont ASEDs QID OU. Cont Pred BID OU. No acute corneal issues. 10/2022 Placed BUL plugs c Medium BVI. Consent form signed. 08/2022 placed BUL plugs c 0.4mm UltraPlugs- pt noted a excessive tearing OU. Consider cautery if displaces. DEC 08/2022. Pt ed of condition that DED tends to be chronic (there is no cure) and will take time to treat based on severity. Discussed in detail ADDE/EDGAR. Oculus Keratograph results indicate OD 60%, OS 60% MG atrophy. Discussed options for treatment such as IPL tx x 4 in order to maintain MG function. Pt aware of out of pocket cost $400.

## 2022-12-15 ENCOUNTER — OFFICE VISIT (OUTPATIENT)
Dept: URBAN - METROPOLITAN AREA CLINIC 24 | Facility: CLINIC | Age: 67
End: 2022-12-15
Payer: MEDICARE

## 2022-12-15 DIAGNOSIS — H02.132 SENILE ECTROPION OF RIGHT LOWER LID: Primary | ICD-10-CM

## 2022-12-15 DIAGNOSIS — H02.135 SENILE ECTROPION OF LEFT LOWER LID: ICD-10-CM

## 2022-12-15 DIAGNOSIS — D48.1 NEOPLASM OF UNCERTIAN BEHAVIOR OF EYELID: ICD-10-CM

## 2022-12-15 PROCEDURE — 92285 EXTERNAL OCULAR PHOTOGRAPHY: CPT | Performed by: OPHTHALMOLOGY

## 2022-12-15 PROCEDURE — 99213 OFFICE O/P EST LOW 20 MIN: CPT | Performed by: OPHTHALMOLOGY

## 2022-12-15 NOTE — IMPRESSION/PLAN
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 Plan: The patient demonstrates tarsal ectropion along with midface descent and negative malar vector. This pathology is causing exposure keratoconjunctivitis, FB sensation, tearing, and ocular irritation. To resolve this, I recommended lateral tarsal strip, conjplasty w/ rearrangement, ATR<10cm eyelid to achieve myocutaneous elevation of the midface orbicularis/SOOF to support the lower eyelid position, and prevent hammocking of the lid beneath the globe given negative malar vector, and ectropion repair w/ suture to replace the tarsus in physiologic position, and protect the globe. Risk and benefits discussed with patient and all questions were answered.

## 2022-12-15 NOTE — IMPRESSION/PLAN
Impression: Senile ectropion of right lower lid: H02.132. Plan: The patient demonstrates tarsal ectropion along with midface descent and negative malar vector. This pathology is causing exposure keratoconjunctivitis, FB sensation, tearing, and ocular irritation. To resolve this, I recommended lateral tarsal strip, conjplasty w/ rearrangement, ATR<10cm eyelid to achieve myocutaneous elevation of the midface orbicularis/SOOF to support the lower eyelid position, and prevent hammocking of the lid beneath the globe given negative malar vector, and ectropion repair w/ suture to replace the tarsus in physiologic position, and protect the globe. Risk and benefits discussed with patient and all questions were answered.

## 2022-12-15 NOTE — IMPRESSION/PLAN
Impression: Neoplasm of uncertian behavior of eyelid: D48.1. Plan: RLL lesion, growing and bothersome to patient. Will excise at time of sx.

## 2023-01-10 ENCOUNTER — OFFICE VISIT (OUTPATIENT)
Dept: URBAN - METROPOLITAN AREA CLINIC 24 | Facility: CLINIC | Age: 68
End: 2023-01-10
Payer: MEDICARE

## 2023-01-10 DIAGNOSIS — H35.353 CYSTOID MACULAR DEGENERATION, BILATERAL: Primary | ICD-10-CM

## 2023-01-10 DIAGNOSIS — H16.143 PUNCTATE KERATITIS, BILATERAL: ICD-10-CM

## 2023-01-10 DIAGNOSIS — H40.1123 PRIMARY OPEN-ANGLE GLAUCOMA, LEFT EYE, SEVERE STAGE: ICD-10-CM

## 2023-01-10 DIAGNOSIS — H35.373 PUCKERING OF MACULA, BILATERAL: ICD-10-CM

## 2023-01-10 PROCEDURE — 99214 OFFICE O/P EST MOD 30 MIN: CPT | Performed by: OPHTHALMOLOGY

## 2023-01-10 PROCEDURE — 92134 CPTRZ OPH DX IMG PST SGM RTA: CPT | Performed by: OPHTHALMOLOGY

## 2023-01-10 ASSESSMENT — INTRAOCULAR PRESSURE
OS: 10
OD: 18

## 2023-01-10 NOTE — IMPRESSION/PLAN
Impression: Punctate keratitis, bilateral: H16.143. PP in place BLL Plan: Cont Cequa OU BID. (0) independent

## 2023-01-10 NOTE — IMPRESSION/PLAN
Impression: Cystoid macular degeneration, bilateral (sutured IOL OU) Plan: CME resolved OU. disc options, has been using PF and ilevro QD OU. recommend cont drops as directed. monitor RTC 3-4 months

## 2023-01-25 ENCOUNTER — OFFICE VISIT (OUTPATIENT)
Dept: URBAN - METROPOLITAN AREA CLINIC 24 | Facility: CLINIC | Age: 68
End: 2023-01-25
Payer: MEDICARE

## 2023-01-25 DIAGNOSIS — H16.143 PUNCTATE KERATITIS, BILATERAL: ICD-10-CM

## 2023-01-25 DIAGNOSIS — H40.1123 PRIMARY OPEN-ANGLE GLAUCOMA, LEFT EYE, SEVERE STAGE: Primary | ICD-10-CM

## 2023-01-25 DIAGNOSIS — H02.135 SENILE ECTROPION OF LEFT LOWER LID: ICD-10-CM

## 2023-01-25 PROCEDURE — 99213 OFFICE O/P EST LOW 20 MIN: CPT | Performed by: OPHTHALMOLOGY

## 2023-01-25 PROCEDURE — 92083 EXTENDED VISUAL FIELD XM: CPT | Performed by: OPHTHALMOLOGY

## 2023-01-25 ASSESSMENT — INTRAOCULAR PRESSURE
OD: 13
OS: 13

## 2023-01-25 NOTE — IMPRESSION/PLAN
Impression: Punctate keratitis, bilateral: H16.143. PP in place BLL Plan: Cont Cequa OU BID. surface much improved.

## 2023-01-25 NOTE — IMPRESSION/PLAN
Impression: Primary open-angle glaucoma, severe stage, left eye Trabeculectomy , MMC/poss tutoplast, MicroPulse OD, PPVIT/ reposition. suture IOL OD, PPVIT, MP +IVTA+KENALOG OD, VIT with IOL replacement OD, YAG OSx 2 , PPVIT R+R with suture IOL OD, Phaco PC IOL OU, MF IOL removal and replacement with standard lens OS ** SLT caused Reoccurence  of CME OD*** Plan: Pt has Glaucoma            Gonio: OD) cbb 1+ pg OS) cbb 2+ pg Pachs: 510/532     Today's IOP : 13/13   Tmax & date :  47, 44 Target IOP low to mid teens Pt denies Fhx of Glaucoma Left eye is the better seeing eye HVF (1/25/23)  OD: inferior wil field loss with superior central loss OS:  dense superior nasal loss with inferior scotoma. Poor reliability, will follow with Optos C/D: .95-.95 OU 
OCT: (10/20/21) 52/50 Pt denies Sulfa Allergy // Pt denies Lung /Heart dx Plan :
1. Continue: 
Timolol PF QAM OU (Changed to OU 5/21/21) Rhopressa QHS OS Ilevro OU QDAY (using every 2-3 days) Pred QD OU (started by Dr. Rachel Diez) 2. Patient's IOP remains stable OU today 3. HVF unreliable, will follow anatomically and with Optos.  
4. Return in 3 months with Dr. Vikas Barajas for IOP check and 6 months with Dr. Fred Ramsay [facility]& [facility] (Tech to Cynthia and dilate)

## 2023-01-25 NOTE — IMPRESSION/PLAN
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  Plan: Proceed as scheduled with Dr. Yael Zhang

## 2023-03-23 ENCOUNTER — ADULT PHYSICAL (OUTPATIENT)
Dept: URBAN - METROPOLITAN AREA CLINIC 24 | Facility: CLINIC | Age: 68
End: 2023-03-23
Payer: MEDICARE

## 2023-03-23 DIAGNOSIS — H02.132 SENILE ECTROPION OF RIGHT LOWER EYELID: ICD-10-CM

## 2023-03-23 DIAGNOSIS — Z01.818 ENCOUNTER FOR OTHER PREPROCEDURAL EXAMINATION: Primary | ICD-10-CM

## 2023-03-23 DIAGNOSIS — H02.135 SENILE ECTROPION OF LEFT LOWER EYELID: ICD-10-CM

## 2023-03-23 PROCEDURE — 99213 OFFICE O/P EST LOW 20 MIN: CPT | Performed by: PHYSICIAN ASSISTANT

## 2023-04-06 ENCOUNTER — POST-OPERATIVE VISIT (OUTPATIENT)
Dept: URBAN - METROPOLITAN AREA CLINIC 24 | Facility: CLINIC | Age: 68
End: 2023-04-06
Payer: MEDICARE

## 2023-04-06 DIAGNOSIS — Z48.89 ENCOUNTER FOR OTHER SPECIFIED SURGICAL AFTERCARE: Primary | ICD-10-CM

## 2023-04-06 PROCEDURE — 99024 POSTOP FOLLOW-UP VISIT: CPT | Performed by: OPHTHALMOLOGY

## 2023-04-06 RX ORDER — DOXYCYCLINE HYCLATE 100 MG/1
100 MG CAPSULE, GELATIN COATED ORAL
Qty: 14 | Refills: 0 | Status: ACTIVE
Start: 2023-04-06

## 2023-04-06 NOTE — IMPRESSION/PLAN
Impression: S/P Adjacent tissue rearrangement less than 10 square centimeter; Lateral Tarsal Strip;; Conjuctivalplasty with rearrangement-both lower lids; Excise lesion-right lower lid OU - 6 Days. Encounter for other specified surgical aftercare  Z48.89. Plan: overall healing well. corneal surface with slight improvement OU. +chemosis. +erythema and edema. Mild nodularity laterally on the left LLL. No purulent drainage. Patient reports that swelling and discharge was worse in days prior to presentation. Low c/f infection but will start doxy 100mg PO BID for 7 days. cont' erythro lizzy to sutures. RTC 2 weeks or sooner PRN.

## 2023-04-18 ENCOUNTER — OFFICE VISIT (OUTPATIENT)
Dept: URBAN - METROPOLITAN AREA CLINIC 24 | Facility: CLINIC | Age: 68
End: 2023-04-18
Payer: MEDICARE

## 2023-04-18 DIAGNOSIS — H35.353 CYSTOID MACULAR DEGENERATION, BILATERAL: Primary | ICD-10-CM

## 2023-04-18 DIAGNOSIS — H35.373 PUCKERING OF MACULA, BILATERAL: ICD-10-CM

## 2023-04-18 DIAGNOSIS — H40.1123 PRIMARY OPEN-ANGLE GLAUCOMA, LEFT EYE, SEVERE STAGE: ICD-10-CM

## 2023-04-18 PROCEDURE — 92134 CPTRZ OPH DX IMG PST SGM RTA: CPT | Performed by: OPHTHALMOLOGY

## 2023-04-18 PROCEDURE — 99214 OFFICE O/P EST MOD 30 MIN: CPT | Performed by: OPHTHALMOLOGY

## 2023-04-18 RX ORDER — NEPAFENAC 3 MG/ML
0.3 % SUSPENSION/ DROPS OPHTHALMIC
Qty: 5 | Refills: 2 | Status: ACTIVE
Start: 2023-04-18

## 2023-04-18 ASSESSMENT — INTRAOCULAR PRESSURE
OS: 14
OD: 10

## 2023-04-18 NOTE — IMPRESSION/PLAN
Impression: Cystoid macular degeneration, bilateral (sutured IOL OU) Plan: CME remains resolved OU. PF QD OU, out of Ilevro.  recommend cont PF QD OU and Ilevro q3-4 days OU

RTC 6 months

## 2023-04-20 ENCOUNTER — POST-OPERATIVE VISIT (OUTPATIENT)
Dept: URBAN - METROPOLITAN AREA CLINIC 24 | Facility: CLINIC | Age: 68
End: 2023-04-20
Payer: MEDICARE

## 2023-04-20 DIAGNOSIS — Z48.89 ENCOUNTER FOR OTHER SPECIFIED SURGICAL AFTERCARE: Primary | ICD-10-CM

## 2023-04-20 PROCEDURE — 99024 POSTOP FOLLOW-UP VISIT: CPT | Performed by: OPHTHALMOLOGY

## 2023-04-20 NOTE — IMPRESSION/PLAN
Impression: S/P Adjacent tissue rearrangement less than 10 square centimeter; Lateral Tarsal Strip;; Conjuctivalplasty with rearrangement-both lower lids; Excise lesion-right lower lid OU - 20 Days. Encounter for other specified surgical aftercare  Z48.89. Plan: healing well. no signs of infection. s/p course of doxy as precaution in setting of discharge (low c/f infection). Post-op instructions reviewed. RTC PRN.

## 2023-04-25 ENCOUNTER — OFFICE VISIT (OUTPATIENT)
Dept: URBAN - METROPOLITAN AREA CLINIC 24 | Facility: CLINIC | Age: 68
End: 2023-04-25
Payer: MEDICARE

## 2023-04-25 DIAGNOSIS — H40.1123 PRIMARY OPEN-ANGLE GLAUCOMA, LEFT EYE, SEVERE STAGE: Primary | ICD-10-CM

## 2023-04-25 PROCEDURE — 92012 INTRM OPH EXAM EST PATIENT: CPT | Performed by: OPTOMETRIST

## 2023-04-25 RX ORDER — TIMOLOL MALEATE 6.8 MG/ML
0.5 % SOLUTION OPHTHALMIC
Qty: 180 | Refills: 3 | Status: ACTIVE
Start: 2023-04-25

## 2023-04-25 RX ORDER — NETARSUDIL 0.2 MG/ML
0.02 % SOLUTION/ DROPS OPHTHALMIC; TOPICAL
Qty: 7.5 | Refills: 3 | Status: ACTIVE
Start: 2023-04-25

## 2023-04-25 ASSESSMENT — INTRAOCULAR PRESSURE
OS: 13
OD: 14
OD: 13
OS: 14

## 2023-04-25 NOTE — IMPRESSION/PLAN
Impression: Primary open-angle glaucoma, severe stage, left eye Trabeculectomy , MMC/poss tutoplast, MicroPulse OD, PPVIT/ reposition. suture IOL OD, PPVIT, MP +IVTA+KENALOG OD, VIT with IOL replacement OD, YAG OSx 2 , PPVIT R+R with suture IOL OD, Phaco PC IOL OU, MF IOL removal and replacement with standard lens OS ** SLT caused Reoccurence  of CME OD*** Plan: IOP stable today; pt compliant with gtts. Continue Timolol PF QAM OU, Rhopressa QHS OS, Ilevro OU 3-4 days (per Dr. Tino Kaye) and Pred QD OU. Under the care of Dr. Jimbo Sanchez and Dr. Tino Kaye, keep all appts as scheduled.

## 2023-05-17 ENCOUNTER — OFFICE VISIT (OUTPATIENT)
Dept: URBAN - METROPOLITAN AREA CLINIC 30 | Facility: CLINIC | Age: 68
End: 2023-05-17
Payer: MEDICARE

## 2023-05-17 DIAGNOSIS — H16.143 PUNCTATE KERATITIS, BILATERAL: Primary | ICD-10-CM

## 2023-05-17 DIAGNOSIS — Z48.89 ENCOUNTER FOR OTHER SPECIFIED SURGICAL AFTERCARE: ICD-10-CM

## 2023-05-17 PROCEDURE — 83861 MICROFLUID ANALY TEARS: CPT | Performed by: OPTOMETRIST

## 2023-05-17 PROCEDURE — 99213 OFFICE O/P EST LOW 20 MIN: CPT | Performed by: OPTOMETRIST

## 2023-05-17 ASSESSMENT — INTRAOCULAR PRESSURE
OD: 11
OS: 13

## 2023-05-17 NOTE — IMPRESSION/PLAN
Impression: S/P Adjacent tissue rearrangement less than 10 square centimeter; Lateral Tarsal Strip;; Conjuctivalplasty with rearrangement-both lower lids; Excise lesion-right lower lid OU - 20 Days. Encounter for other specified surgical aftercare  Z48.89. Plan: S/P LTS OU c ectropion repair OS.

## 2023-05-17 NOTE — IMPRESSION/PLAN
Impression: Punctate keratitis, bilateral: H16.143. PP in place BLL
 +BP Meds; [address] gtts. OSMO Unable OU 
08/2022 SCHIRMERS 21, 21
5/2023 SCHIRMERS 15,10 NI on Restasis Plan: OD>OS. Referred by Dr. Bladimir Lambert. Also with mild LSCD superiorly OD. Small epithelial break superiorly OD. Previously advised against Prokera given elevated bleb OD. Pt notes glare OU, mostly indoors- may be DED related. Pt notes excessive tearing OU c BULs. Consider punctal cautery if BLL PP's continue to displace. Currently on PF glc gtts. Not using lubrication: PFATs q1-2hr w/a, gel or lizzy qhs OU- resume. Not using Cequa OU BID- pt notes some burning. Pt concerned that the burning is damaging his cornea. Not using ASEDs QID OU- resume at TID OU. Not using Pred BID OU.

10/2022 Placed BUL plugs c Medium BVI. Consent form signed. 08/2022 placed BUL plugs c 0.4mm UltraPlugs- pt noted a excessive tearing OU. Consider cautery if displaces. DEC 08/2022. Pt ed of condition that DED tends to be chronic (there is no cure) and will take time to treat based on severity. Discussed in detail ADDE/EDGAR. Oculus Keratograph results indicate OD 60%, OS 60% MG atrophy. Discussed options for treatment such as IPL tx x 4 in order to maintain MG function. Pt aware of out of pocket cost $400.

## 2023-07-25 ENCOUNTER — OFFICE VISIT (OUTPATIENT)
Dept: URBAN - METROPOLITAN AREA CLINIC 30 | Facility: CLINIC | Age: 68
End: 2023-07-25
Payer: MEDICARE

## 2023-07-25 PROCEDURE — 83861 MICROFLUID ANALY TEARS: CPT | Performed by: OPTOMETRIST

## 2023-07-25 PROCEDURE — 99213 OFFICE O/P EST LOW 20 MIN: CPT | Performed by: OPTOMETRIST

## 2023-07-25 ASSESSMENT — INTRAOCULAR PRESSURE
OD: 10
OS: 12

## 2023-07-26 ENCOUNTER — OFFICE VISIT (OUTPATIENT)
Dept: URBAN - METROPOLITAN AREA CLINIC 24 | Facility: CLINIC | Age: 68
End: 2023-07-26
Payer: MEDICARE

## 2023-07-26 DIAGNOSIS — H40.1123 PRIMARY OPEN-ANGLE GLAUCOMA, LEFT EYE, SEVERE STAGE: Primary | ICD-10-CM

## 2023-07-26 DIAGNOSIS — H16.143 PUNCTATE KERATITIS, BILATERAL: ICD-10-CM

## 2023-07-26 DIAGNOSIS — H02.135 SENILE ECTROPION OF LEFT LOWER LID: ICD-10-CM

## 2023-07-26 PROCEDURE — 99214 OFFICE O/P EST MOD 30 MIN: CPT | Performed by: OPHTHALMOLOGY

## 2023-07-26 ASSESSMENT — INTRAOCULAR PRESSURE
OS: 16
OD: 13
OS: 22
OD: 12

## 2023-08-17 ENCOUNTER — OFFICE VISIT (OUTPATIENT)
Dept: URBAN - METROPOLITAN AREA CLINIC 30 | Facility: CLINIC | Age: 68
End: 2023-08-17
Payer: MEDICARE

## 2023-08-17 DIAGNOSIS — H16.143 PUNCTATE KERATITIS, BILATERAL: ICD-10-CM

## 2023-08-17 DIAGNOSIS — H40.1123 PRIMARY OPEN-ANGLE GLAUCOMA, LEFT EYE, SEVERE STAGE: Primary | ICD-10-CM

## 2023-08-17 PROCEDURE — 92133 CPTRZD OPH DX IMG PST SGM ON: CPT | Performed by: OPTOMETRIST

## 2023-08-17 PROCEDURE — 99214 OFFICE O/P EST MOD 30 MIN: CPT | Performed by: OPTOMETRIST

## 2023-08-17 RX ORDER — DORZOLAMIDE HCL/PF 2 %
2 % DROPS OPHTHALMIC (EYE)
Qty: 10 | Refills: 2 | Status: ACTIVE
Start: 2023-08-17

## 2023-08-17 ASSESSMENT — INTRAOCULAR PRESSURE
OS: 21
OD: 13
OD: 12

## 2023-09-06 ENCOUNTER — OFFICE VISIT (OUTPATIENT)
Dept: URBAN - METROPOLITAN AREA CLINIC 24 | Facility: CLINIC | Age: 68
End: 2023-09-06
Payer: MEDICARE

## 2023-09-06 DIAGNOSIS — H40.1123 PRIMARY OPEN-ANGLE GLAUCOMA, LEFT EYE, SEVERE STAGE: Primary | ICD-10-CM

## 2023-09-06 DIAGNOSIS — H16.143 PUNCTATE KERATITIS, BILATERAL: ICD-10-CM

## 2023-09-06 DIAGNOSIS — H40.1113 PRIMARY OPEN-ANGLE GLAUCOMA, SEVERE STAGE, RIGHT EYE: ICD-10-CM

## 2023-09-06 PROCEDURE — 99214 OFFICE O/P EST MOD 30 MIN: CPT | Performed by: OPHTHALMOLOGY

## 2023-09-06 ASSESSMENT — INTRAOCULAR PRESSURE
OS: 20
OD: 25

## 2024-01-25 ENCOUNTER — OFFICE VISIT (OUTPATIENT)
Dept: URBAN - METROPOLITAN AREA CLINIC 30 | Facility: CLINIC | Age: 69
End: 2024-01-25
Payer: MEDICARE

## 2024-01-25 DIAGNOSIS — H40.1113 PRIMARY OPEN-ANGLE GLAUCOMA, SEVERE STAGE, RIGHT EYE: Primary | ICD-10-CM

## 2024-01-25 DIAGNOSIS — H40.1123 PRIMARY OPEN-ANGLE GLAUCOMA, LEFT EYE, SEVERE STAGE: ICD-10-CM

## 2024-01-25 DIAGNOSIS — H16.143 PUNCTATE KERATITIS, BILATERAL: ICD-10-CM

## 2024-01-25 DIAGNOSIS — H35.353 CYSTOID MACULAR DEGENERATION, BILATERAL: ICD-10-CM

## 2024-01-25 PROCEDURE — 99213 OFFICE O/P EST LOW 20 MIN: CPT | Performed by: OPTOMETRIST

## 2024-01-25 PROCEDURE — 83861 MICROFLUID ANALY TEARS: CPT | Performed by: OPTOMETRIST

## 2024-01-25 ASSESSMENT — INTRAOCULAR PRESSURE
OS: 17
OD: 21
OD: 22
OS: 20

## 2024-02-27 ENCOUNTER — OFFICE VISIT (OUTPATIENT)
Dept: URBAN - METROPOLITAN AREA CLINIC 24 | Facility: CLINIC | Age: 69
End: 2024-02-27
Payer: MEDICARE

## 2024-02-27 DIAGNOSIS — H40.1113 PRIMARY OPEN-ANGLE GLAUCOMA, SEVERE STAGE, RIGHT EYE: ICD-10-CM

## 2024-02-27 DIAGNOSIS — H16.143 PUNCTATE KERATITIS, BILATERAL: ICD-10-CM

## 2024-02-27 DIAGNOSIS — H35.353 CYSTOID MACULAR DEGENERATION, BILATERAL: ICD-10-CM

## 2024-02-27 DIAGNOSIS — H40.1123 PRIMARY OPEN-ANGLE GLAUCOMA, LEFT EYE, SEVERE STAGE: Primary | ICD-10-CM

## 2024-02-27 PROCEDURE — 99213 OFFICE O/P EST LOW 20 MIN: CPT | Performed by: OPHTHALMOLOGY

## 2024-02-27 ASSESSMENT — INTRAOCULAR PRESSURE
OD: 18
OS: 15

## 2024-03-25 ENCOUNTER — OFFICE VISIT (OUTPATIENT)
Dept: URBAN - METROPOLITAN AREA CLINIC 30 | Facility: CLINIC | Age: 69
End: 2024-03-25
Payer: MEDICARE

## 2024-03-25 DIAGNOSIS — H16.143 PUNCTATE KERATITIS, BILATERAL: ICD-10-CM

## 2024-03-25 DIAGNOSIS — H00.021 HORDEOLUM INTERNUM RIGHT UPPER LID: ICD-10-CM

## 2024-03-25 DIAGNOSIS — H35.353 CYSTOID MACULAR DEGENERATION, BILATERAL: ICD-10-CM

## 2024-03-25 DIAGNOSIS — H35.373 PUCKERING OF MACULA, BILATERAL: ICD-10-CM

## 2024-03-25 DIAGNOSIS — H40.1113 PRIMARY OPEN-ANGLE GLAUCOMA, SEVERE STAGE, RIGHT EYE: ICD-10-CM

## 2024-03-25 DIAGNOSIS — H40.1123 PRIMARY OPEN-ANGLE GLAUCOMA, LEFT EYE, SEVERE STAGE: Primary | ICD-10-CM

## 2024-03-25 DIAGNOSIS — H00.024 HORDEOLUM INTERNUM LEFT UPPER LID: ICD-10-CM

## 2024-03-25 PROCEDURE — 92014 COMPRE OPH EXAM EST PT 1/>: CPT | Performed by: OPTOMETRIST

## 2024-03-25 PROCEDURE — 92133 CPTRZD OPH DX IMG PST SGM ON: CPT | Performed by: OPTOMETRIST

## 2024-03-25 PROCEDURE — 83861 MICROFLUID ANALY TEARS: CPT | Performed by: OPTOMETRIST

## 2024-03-25 RX ORDER — OFLOXACIN 3 MG/ML
0.3 % SOLUTION/ DROPS OPHTHALMIC
Qty: 5 | Refills: 2 | Status: ACTIVE
Start: 2024-03-25

## 2024-03-25 ASSESSMENT — KERATOMETRY
OD: 46.52
OS: 2318.26

## 2024-03-25 ASSESSMENT — VISUAL ACUITY
OD: 20/500
OS: 20/25

## 2024-03-25 ASSESSMENT — INTRAOCULAR PRESSURE
OD: 13
OD: 18
OS: 16

## 2024-05-24 ENCOUNTER — OFFICE VISIT (OUTPATIENT)
Dept: URBAN - METROPOLITAN AREA CLINIC 30 | Facility: CLINIC | Age: 69
End: 2024-05-24
Payer: MEDICARE

## 2024-05-24 DIAGNOSIS — H16.143 PUNCTATE KERATITIS, BILATERAL: Primary | ICD-10-CM

## 2024-05-24 DIAGNOSIS — H40.1123 PRIMARY OPEN-ANGLE GLAUCOMA, LEFT EYE, SEVERE STAGE: ICD-10-CM

## 2024-05-24 PROCEDURE — 99213 OFFICE O/P EST LOW 20 MIN: CPT | Performed by: OPTOMETRIST

## 2024-05-24 ASSESSMENT — INTRAOCULAR PRESSURE
OD: 9
OS: 14

## 2024-05-28 ENCOUNTER — OFFICE VISIT (OUTPATIENT)
Dept: URBAN - METROPOLITAN AREA CLINIC 24 | Facility: CLINIC | Age: 69
End: 2024-05-28
Payer: MEDICARE

## 2024-05-28 DIAGNOSIS — H40.1123 PRIMARY OPEN-ANGLE GLAUCOMA, LEFT EYE, SEVERE STAGE: Primary | ICD-10-CM

## 2024-05-28 DIAGNOSIS — H35.353 CYSTOID MACULAR DEGENERATION, BILATERAL: ICD-10-CM

## 2024-05-28 DIAGNOSIS — S05.01XA CORNEAL ABRASION OF RIGHT EYE, INITIAL ENCOUNTER: ICD-10-CM

## 2024-05-28 DIAGNOSIS — H40.1113 PRIMARY OPEN-ANGLE GLAUCOMA, SEVERE STAGE, RIGHT EYE: ICD-10-CM

## 2024-05-28 PROCEDURE — 99213 OFFICE O/P EST LOW 20 MIN: CPT | Performed by: OPHTHALMOLOGY

## 2024-05-28 RX ORDER — OFLOXACIN 3 MG/ML
0.3 % SOLUTION/ DROPS OPHTHALMIC
Qty: 5 | Refills: 2 | Status: ACTIVE
Start: 2024-05-28

## 2024-05-28 RX ORDER — ERYTHROMYCIN 5 MG/G
OINTMENT OPHTHALMIC
Qty: 3.5 | Refills: 2 | Status: ACTIVE
Start: 2024-05-28

## 2024-05-28 RX ORDER — NETARSUDIL 0.2 MG/ML
0.02 % SOLUTION/ DROPS OPHTHALMIC; TOPICAL
Qty: 7.5 | Refills: 3 | Status: ACTIVE
Start: 2024-05-28

## 2024-05-28 ASSESSMENT — INTRAOCULAR PRESSURE
OD: 4
OS: 10

## 2024-08-26 ENCOUNTER — OFFICE VISIT (OUTPATIENT)
Dept: URBAN - METROPOLITAN AREA CLINIC 30 | Facility: CLINIC | Age: 69
End: 2024-08-26
Payer: MEDICARE

## 2024-08-26 DIAGNOSIS — H40.1123 PRIMARY OPEN-ANGLE GLAUCOMA, LEFT EYE, SEVERE STAGE: ICD-10-CM

## 2024-08-26 DIAGNOSIS — H16.143 PUNCTATE KERATITIS, BILATERAL: Primary | ICD-10-CM

## 2024-08-26 PROCEDURE — 99213 OFFICE O/P EST LOW 20 MIN: CPT | Performed by: OPTOMETRIST

## 2024-08-26 ASSESSMENT — INTRAOCULAR PRESSURE
OS: 16
OD: 8
OD: 9

## 2024-08-30 NOTE — IMPRESSION/PLAN
Impression: Primary open-angle glaucoma, severe stage, left eye Trabeculectomy , MMC/poss tutoplast, MicroPulse OD, PPVIT/ reposition. suture IOL OD, PPVIT, MP +IVTA+KENALOG OD, VIT with IOL replacement OD, YAG OSx 2 , PPVIT R+R with suture IOL OD, Phaco PC IOL OU, MF IOL removal and replacement with standard lens OS ** SLT caused Reoccurence  of CME OD*** Plan: IOP stable today. Under the care of Dr. Dwayne Scheuermann, keep all appts as scheduled Continue Timolol PF QAM OU, Rhopressa QHS OS, Ilevro OU QDAY (using every 2-3 days) and Pred BID OU (started by Dr. Ace Ken).
Treatment Goal Explanation (Does Not Render In The Note): Stable for the purposes of categorizing medical decision making is defined by the specific treatment goals for an individual patient. A patient that is not at their treatment goal is not stable, even if the condition has not changed and there is no short-term threat to life or function.

## 2025-02-24 ENCOUNTER — OFFICE VISIT (OUTPATIENT)
Dept: URBAN - METROPOLITAN AREA CLINIC 30 | Facility: CLINIC | Age: 70
End: 2025-02-24
Payer: MEDICARE

## 2025-02-24 DIAGNOSIS — H16.143 PUNCTATE KERATITIS, BILATERAL: ICD-10-CM

## 2025-02-24 DIAGNOSIS — H40.1113 PRIMARY OPEN-ANGLE GLAUCOMA, SEVERE STAGE, RIGHT EYE: ICD-10-CM

## 2025-02-24 DIAGNOSIS — H35.373 PUCKERING OF MACULA, BILATERAL: ICD-10-CM

## 2025-02-24 DIAGNOSIS — H35.353 CYSTOID MACULAR DEGENERATION, BILATERAL: ICD-10-CM

## 2025-02-24 DIAGNOSIS — H40.1123 PRIMARY OPEN-ANGLE GLAUCOMA, LEFT EYE, SEVERE STAGE: Primary | ICD-10-CM

## 2025-02-24 PROCEDURE — 99214 OFFICE O/P EST MOD 30 MIN: CPT | Performed by: OPTOMETRIST

## 2025-02-24 PROCEDURE — 92134 CPTRZ OPH DX IMG PST SGM RTA: CPT | Performed by: OPTOMETRIST

## 2025-02-24 ASSESSMENT — INTRAOCULAR PRESSURE
OD: 16
OS: 20
OD: 14

## 2025-02-24 ASSESSMENT — KERATOMETRY
OD: 45.13
OS: 45.00

## 2025-02-24 ASSESSMENT — VISUAL ACUITY
OD: CF 3FT
OS: 20/25

## 2025-04-08 ENCOUNTER — OFFICE VISIT (OUTPATIENT)
Dept: URBAN - METROPOLITAN AREA CLINIC 24 | Facility: CLINIC | Age: 70
End: 2025-04-08
Payer: MEDICARE

## 2025-04-08 DIAGNOSIS — H40.1113 PRIMARY OPEN-ANGLE GLAUCOMA, SEVERE STAGE, RIGHT EYE: ICD-10-CM

## 2025-04-08 DIAGNOSIS — H40.1123 PRIMARY OPEN-ANGLE GLAUCOMA, LEFT EYE, SEVERE STAGE: Primary | ICD-10-CM

## 2025-04-08 PROCEDURE — 99214 OFFICE O/P EST MOD 30 MIN: CPT | Performed by: OPHTHALMOLOGY

## 2025-04-08 ASSESSMENT — INTRAOCULAR PRESSURE
OD: 13
OS: 19

## 2025-05-27 ENCOUNTER — SURGERY (OUTPATIENT)
Facility: LOCATION | Age: 70
End: 2025-05-27
Payer: MEDICARE

## 2025-05-27 PROCEDURE — 65855 TRABECULOPLASTY LASER SURG: CPT | Performed by: OPHTHALMOLOGY

## 2025-05-27 RX ORDER — MOXIFLOXACIN HYDROCHLORIDE 5 MG/ML
0.5 % SOLUTION/ DROPS OPHTHALMIC
Qty: 1 | Refills: 1 | Status: ACTIVE
Start: 2025-05-27

## 2025-05-30 ENCOUNTER — OFFICE VISIT (OUTPATIENT)
Dept: URBAN - METROPOLITAN AREA CLINIC 24 | Facility: CLINIC | Age: 70
End: 2025-05-30
Payer: MEDICARE

## 2025-05-30 DIAGNOSIS — H59.40: Primary | ICD-10-CM

## 2025-05-30 PROCEDURE — 99213 OFFICE O/P EST LOW 20 MIN: CPT | Performed by: STUDENT IN AN ORGANIZED HEALTH CARE EDUCATION/TRAINING PROGRAM

## 2025-05-30 RX ORDER — ERYTHROMYCIN 5 MG/G
OINTMENT OPHTHALMIC
Qty: 3.5 | Refills: 2 | Status: ACTIVE
Start: 2025-05-30

## 2025-05-30 RX ORDER — ERYTHROMYCIN 5 MG/G
OINTMENT OPHTHALMIC
Qty: 3.5 | Refills: 2 | Status: INACTIVE
Start: 2025-05-30 | End: 2025-05-30

## 2025-05-30 ASSESSMENT — INTRAOCULAR PRESSURE
OD: 13
OS: 20

## 2025-06-04 ENCOUNTER — OFFICE VISIT (OUTPATIENT)
Dept: URBAN - METROPOLITAN AREA CLINIC 30 | Facility: CLINIC | Age: 70
End: 2025-06-04
Payer: MEDICARE

## 2025-06-04 DIAGNOSIS — H59.40: Primary | ICD-10-CM

## 2025-06-04 PROCEDURE — 99213 OFFICE O/P EST LOW 20 MIN: CPT | Performed by: STUDENT IN AN ORGANIZED HEALTH CARE EDUCATION/TRAINING PROGRAM

## 2025-06-04 ASSESSMENT — INTRAOCULAR PRESSURE
OS: 17
OD: 13

## 2025-07-15 ENCOUNTER — OFFICE VISIT (OUTPATIENT)
Dept: URBAN - METROPOLITAN AREA CLINIC 24 | Facility: CLINIC | Age: 70
End: 2025-07-15
Payer: MEDICARE

## 2025-07-15 DIAGNOSIS — H40.1113 PRIMARY OPEN-ANGLE GLAUCOMA, SEVERE STAGE, RIGHT EYE: ICD-10-CM

## 2025-07-15 DIAGNOSIS — H40.1123 PRIMARY OPEN-ANGLE GLAUCOMA, LEFT EYE, SEVERE STAGE: Primary | ICD-10-CM

## 2025-07-15 DIAGNOSIS — H34.8110 CENTRAL RETINAL VEIN OCCLUSION W/ MACULAR EDEMA, RIGHT EYE: ICD-10-CM

## 2025-07-15 DIAGNOSIS — H35.353 CYSTOID MACULAR DEGENERATION, BILATERAL: ICD-10-CM

## 2025-07-15 DIAGNOSIS — H40.1133 PRIMARY OPEN-ANGLE GLAUCOMA, BILATERAL, SEVERE STAGE: ICD-10-CM

## 2025-07-15 PROCEDURE — 99214 OFFICE O/P EST MOD 30 MIN: CPT | Performed by: OPTOMETRIST

## 2025-07-15 PROCEDURE — 92250 FUNDUS PHOTOGRAPHY W/I&R: CPT | Performed by: OPTOMETRIST

## 2025-07-15 ASSESSMENT — INTRAOCULAR PRESSURE
OD: 8
OS: 13